# Patient Record
Sex: MALE | Race: WHITE | NOT HISPANIC OR LATINO | Employment: FULL TIME | URBAN - METROPOLITAN AREA
[De-identification: names, ages, dates, MRNs, and addresses within clinical notes are randomized per-mention and may not be internally consistent; named-entity substitution may affect disease eponyms.]

---

## 2017-03-29 ENCOUNTER — OFFICE VISIT (OUTPATIENT)
Dept: URGENT CARE | Facility: PHYSICIAN GROUP | Age: 47
End: 2017-03-29
Payer: COMMERCIAL

## 2017-03-29 ENCOUNTER — HOSPITAL ENCOUNTER (OUTPATIENT)
Dept: RADIOLOGY | Facility: MEDICAL CENTER | Age: 47
End: 2017-03-29
Attending: NURSE PRACTITIONER
Payer: COMMERCIAL

## 2017-03-29 VITALS
HEIGHT: 74 IN | OXYGEN SATURATION: 97 % | TEMPERATURE: 96.9 F | DIASTOLIC BLOOD PRESSURE: 70 MMHG | SYSTOLIC BLOOD PRESSURE: 130 MMHG | BODY MASS INDEX: 30.8 KG/M2 | WEIGHT: 240 LBS | HEART RATE: 92 BPM

## 2017-03-29 DIAGNOSIS — R05.9 COUGH: ICD-10-CM

## 2017-03-29 DIAGNOSIS — R06.02 SOB (SHORTNESS OF BREATH): ICD-10-CM

## 2017-03-29 DIAGNOSIS — J18.9 PNEUMONIA OF LEFT LOWER LOBE DUE TO INFECTIOUS ORGANISM: ICD-10-CM

## 2017-03-29 PROCEDURE — 71020 DX-CHEST-2 VIEWS: CPT

## 2017-03-29 PROCEDURE — 99204 OFFICE O/P NEW MOD 45 MIN: CPT | Performed by: NURSE PRACTITIONER

## 2017-03-29 RX ORDER — PREDNISONE 20 MG/1
TABLET ORAL
Qty: 10 TAB | Refills: 0 | Status: SHIPPED | OUTPATIENT
Start: 2017-03-29 | End: 2017-04-10

## 2017-03-29 RX ORDER — ALBUTEROL SULFATE 90 UG/1
2 AEROSOL, METERED RESPIRATORY (INHALATION) EVERY 6 HOURS PRN
Qty: 8.5 G | Refills: 0 | Status: SHIPPED | OUTPATIENT
Start: 2017-03-29 | End: 2017-04-10 | Stop reason: SDUPTHER

## 2017-03-29 RX ORDER — CODEINE PHOSPHATE AND GUAIFENESIN 10; 100 MG/5ML; MG/5ML
5 SOLUTION ORAL EVERY 4 HOURS PRN
Qty: 400 ML | Refills: 0 | Status: SHIPPED | OUTPATIENT
Start: 2017-03-29 | End: 2017-04-10

## 2017-03-29 RX ORDER — DOXYCYCLINE HYCLATE 100 MG
100 TABLET ORAL 2 TIMES DAILY
Qty: 20 TAB | Refills: 0 | Status: SHIPPED | OUTPATIENT
Start: 2017-03-29 | End: 2017-04-08

## 2017-03-29 ASSESSMENT — COPD QUESTIONNAIRES: COPD: 1

## 2017-03-29 ASSESSMENT — ENCOUNTER SYMPTOMS
RHINORRHEA: 1
COUGH: 1
SWEATS: 1
WHEEZING: 1
FEVER: 1
SORE THROAT: 0
SPUTUM PRODUCTION: 0
HEADACHES: 1
CHILLS: 1
SHORTNESS OF BREATH: 1

## 2017-03-29 NOTE — Clinical Note
March 29, 2017         Patient: Milind Mackey   YOB: 1970   Date of Visit: 3/29/2017           To Whom it May Concern:    Milind Mackey was seen in my clinic on 3/29/2017. He may return to work 4/3/17.        Sincerely,           OMAYRA Tovar  Electronically Signed

## 2017-03-29 NOTE — PROGRESS NOTES
Subjective:      Milind Mackey is a 47 y.o. male who presents with Cough            Cough  This is a new problem. Episode onset: 3 days ago. The problem has been gradually worsening. The problem occurs constantly. The cough is non-productive. Associated symptoms include chills, a fever (102F at home), headaches, nasal congestion, postnasal drip, rhinorrhea, shortness of breath, sweats and wheezing. Pertinent negatives include no ear congestion, ear pain or sore throat. Nothing aggravates the symptoms. Risk factors for lung disease include smoking/tobacco exposure. He has tried rest (Tylenol and Ibuprofen) for the symptoms. The treatment provided no relief. His past medical history is significant for COPD. There is no history of asthma, bronchitis or pneumonia.       Review of Systems   Constitutional: Positive for fever (102F at home), chills and malaise/fatigue.   HENT: Positive for congestion, postnasal drip and rhinorrhea. Negative for ear pain and sore throat.    Respiratory: Positive for cough, shortness of breath and wheezing. Negative for sputum production.    Neurological: Positive for headaches.   All other systems reviewed and are negative.    PMH:  has a past medical history of Hypertension; Hepatitis C; and Tobacco use (5/10/2013). He also has no past medical history of Hyperlipidemia, Diabetes, Depression, or ASTHMA.  MEDS:   Current outpatient prescriptions:   •  methocarbamol (ROBAXIN) 500 MG Tab, Take 1 Tab by mouth 3 times a day. as needed for muscle spasm, Disp: 90 Tab, Rfl: 2  •  hydrocodone/acetaminophen (NORCO)  MG Tab, Take 1/2 to 1 tablet by mouth three per day as needed for pain. The earliest date the pharmacy may fill the rx is 3/22/2016., Disp: 90 Tab, Rfl: 0  •  losartan (COZAAR) 50 MG Tab, Take 1 Tab by mouth every day., Disp: 90 Tab, Rfl: 1  •  gabapentin (NEURONTIN) 300 MG Cap, Take 1 to 2 tablets by mouth every 8 hours as needed for nerve pain., Disp: 100 Cap, Rfl:  "2  ALLERGIES: No Known Allergies  SURGHX:   Past Surgical History   Procedure Laterality Date   • Recovery  4/13/2012     Performed by SURGERY, IR-RECOVERY at SURGERY SAME DAY [facility] ORS     SOCHX:  reports that he has been smoking.  He has never used smokeless tobacco. He reports that he does not drink alcohol or use illicit drugs.  FH: In accordance with LEX Act of 2008, family history is not collected for Occupational Health visits.         Objective:     /70 mmHg  Pulse 92  Temp(Src) 36.1 °C (96.9 °F)  Ht 1.88 m (6' 2\")  Wt 108.863 kg (240 lb)  BMI 30.80 kg/m2  SpO2 97%     Physical Exam   Constitutional: He is oriented to person, place, and time. Vital signs are normal. He appears well-developed and well-nourished.   HENT:   Head: Normocephalic.   Right Ear: Tympanic membrane and external ear normal.   Left Ear: Tympanic membrane and external ear normal.   Nose: Rhinorrhea present. Right sinus exhibits frontal sinus tenderness. Left sinus exhibits frontal sinus tenderness.   Mouth/Throat: Posterior oropharyngeal erythema present.   Eyes: EOM are normal. Pupils are equal, round, and reactive to light.   Neck: Trachea normal, normal range of motion and phonation normal. Neck supple.   Cardiovascular: Normal rate and regular rhythm.    Pulmonary/Chest: Effort normal. He has wheezes in the right upper field, the right lower field, the left upper field and the left lower field. He has rhonchi in the right upper field and the left upper field. He has rales in the left lower field.   Musculoskeletal: Normal range of motion.   Lymphadenopathy:        Head (right side): Submandibular adenopathy present.        Head (left side): Submandibular adenopathy present.     He has no cervical adenopathy.   Neurological: He is alert and oriented to person, place, and time.   Skin: Skin is warm and dry.   Psychiatric: He has a normal mood and affect. His speech is normal and behavior is normal. Thought content " normal.   Vitals reviewed.              Assessment/Plan:     1. Pneumonia of left lower lobe due to infectious organism  - doxycycline (VIBRAMYCIN) 100 MG Tab; Take 1 Tab by mouth 2 times a day for 10 days.  Dispense: 20 Tab; Refill: 0    2. Cough  - DX-CHEST-2 VIEWS; Future  - predniSONE (DELTASONE) 20 MG Tab; Take 2 tabs PO daily for five days  Dispense: 10 Tab; Refill: 0  - albuterol 108 (90 BASE) MCG/ACT Aero Soln inhalation aerosol; Inhale 2 Puffs by mouth every 6 hours as needed for Shortness of Breath.  Dispense: 8.5 g; Refill: 0  - guaifenesin-codeine (ROBITUSSIN AC) Solution oral solution; Take 5 mL by mouth every four hours as needed for Cough.  Dispense: 400 mL; Refill: 0    3. SOB (shortness of breath)  - DX-CHEST-2 VIEWS; Future  - predniSONE (DELTASONE) 20 MG Tab; Take 2 tabs PO daily for five days  Dispense: 10 Tab; Refill: 0  - albuterol 108 (90 BASE) MCG/ACT Aero Soln inhalation aerosol; Inhale 2 Puffs by mouth every 6 hours as needed for Shortness of Breath.  Dispense: 8.5 g; Refill: 0    Increase fluid intake  Tylenol and Ibuprofen for fever/bodyaches  Instructed to make PCP appt in 2-3 weeks for F/U  Supportive care, differential diagnoses, and indications for immediate follow-up discussed with patient.    Pathogenesis of diagnosis discussed including typical length and natural progression.      Instructed to return to  or nearest emergency department if symptoms fail to improve, for any change in condition, further concerns, or new concerning symptoms.  Patient states understanding of the plan of care and discharge instructions.

## 2017-04-03 RX ORDER — LOSARTAN POTASSIUM 50 MG/1
TABLET ORAL
Refills: 0 | OUTPATIENT
Start: 2017-04-03

## 2017-04-03 NOTE — TELEPHONE ENCOUNTER
Was the patient seen in the last year in this department? No 10/2015    Does patient have an active prescription for medications requested? No     Received Request Via: Pharmacy

## 2017-04-04 RX ORDER — LOSARTAN POTASSIUM 50 MG/1
TABLET ORAL
Refills: 0 | OUTPATIENT
Start: 2017-04-04

## 2017-04-10 ENCOUNTER — OFFICE VISIT (OUTPATIENT)
Dept: MEDICAL GROUP | Facility: PHYSICIAN GROUP | Age: 47
End: 2017-04-10
Payer: COMMERCIAL

## 2017-04-10 VITALS
SYSTOLIC BLOOD PRESSURE: 124 MMHG | DIASTOLIC BLOOD PRESSURE: 80 MMHG | HEIGHT: 74 IN | BODY MASS INDEX: 26.69 KG/M2 | OXYGEN SATURATION: 96 % | TEMPERATURE: 100.6 F | RESPIRATION RATE: 16 BRPM | WEIGHT: 208 LBS | HEART RATE: 103 BPM

## 2017-04-10 DIAGNOSIS — J18.9 PNEUMONIA OF LEFT LOWER LOBE DUE TO INFECTIOUS ORGANISM: Primary | ICD-10-CM

## 2017-04-10 DIAGNOSIS — F17.200 CURRENT SMOKER: ICD-10-CM

## 2017-04-10 DIAGNOSIS — I10 ESSENTIAL HYPERTENSION: ICD-10-CM

## 2017-04-10 DIAGNOSIS — R05.9 COUGH: ICD-10-CM

## 2017-04-10 PROCEDURE — 99214 OFFICE O/P EST MOD 30 MIN: CPT | Performed by: NURSE PRACTITIONER

## 2017-04-10 RX ORDER — AZITHROMYCIN 250 MG/1
TABLET, FILM COATED ORAL
Qty: 6 TAB | Refills: 0 | Status: SHIPPED | OUTPATIENT
Start: 2017-04-10 | End: 2018-01-17

## 2017-04-10 RX ORDER — LOSARTAN POTASSIUM 50 MG/1
50 TABLET ORAL
Qty: 90 TAB | Refills: 3 | Status: SHIPPED | OUTPATIENT
Start: 2017-04-10 | End: 2018-05-29 | Stop reason: SDUPTHER

## 2017-04-10 RX ORDER — ALBUTEROL SULFATE 90 UG/1
2 AEROSOL, METERED RESPIRATORY (INHALATION) EVERY 6 HOURS PRN
Qty: 8.5 G | Refills: 1 | Status: SHIPPED | OUTPATIENT
Start: 2017-04-10 | End: 2018-01-23

## 2017-04-10 ASSESSMENT — PATIENT HEALTH QUESTIONNAIRE - PHQ9: CLINICAL INTERPRETATION OF PHQ2 SCORE: 0

## 2017-04-10 NOTE — PROGRESS NOTES
Chief Complaint   Patient presents with   • Follow-Up     pneumonia   • Medication Refill       HISTORY OF PRESENT ILLNESS: Patient is a 47 y.o. male established patient who presents today to follow-up on a recent urgent care visit    1. Pneumonia of left lower lobe due to infectious organism 2. Cough  Patient was seen in the urgent care 2 weeks ago with complaints of cough and fever.  He was given a course of doxycycline and reports completing the entire course one week ago.  He states that symptoms returned shortly after completion.  He reports fever, chills, cough and chest pain.  He does report some relief with the albuterol.  He continues to smoke, however he reduce the use from one pack a day to one pack every 3-4 days.  He states that he is going to use this opportunity to quit smoking altogether.      3. Current smoker    4. Essential hypertension   Patient is also requesting a refill of losartan.  He uses 50 mg daily.  Blood pressure today is well controlled with his current medication regimen.  He does not monitor his blood pressure routinely outside of the clinic.    Allergies:Review of patient's allergies indicates no known allergies.    Current Outpatient Prescriptions Ordered in [facility]   Medication Sig Dispense Refill   • azithromycin (ZITHROMAX) 250 MG Tab Take 500mg day 1, then 250mg days 2-5. 6 Tab 0   • albuterol 108 (90 BASE) MCG/ACT Aero Soln inhalation aerosol Inhale 2 Puffs by mouth every 6 hours as needed for Shortness of Breath. 8.5 g 1   • losartan (COZAAR) 50 MG Tab Take 1 Tab by mouth every day. 90 Tab 3   • methocarbamol (ROBAXIN) 500 MG Tab Take 1 Tab by mouth 3 times a day. as needed for muscle spasm 90 Tab 2   • gabapentin (NEURONTIN) 300 MG Cap Take 1 to 2 tablets by mouth every 8 hours as needed for nerve pain. 100 Cap 2   • hydrocodone/acetaminophen (NORCO)  MG Tab Take 1/2 to 1 tablet by mouth three per day as needed for pain. The earliest date the pharmacy may fill the rx is  "3/22/2016. 90 Tab 0     No current Epic-ordered facility-administered medications on file.       Past Medical History   Diagnosis Date   • Hypertension    • Hepatitis C    • Tobacco use 5/10/2013       Social History   Substance Use Topics   • Smoking status: Current Every Day Smoker -- 1.00 packs/day for 25 years   • Smokeless tobacco: Never Used   • Alcohol Use: No       Family Status   Relation Status Death Age   • Mother     • Father     • Sister Alive    • Brother Alive    • Maternal Grandmother     • Sister Alive      Family History   Problem Relation Age of Onset   • Other Mother 63     hepatits C, cirrhosis   • Heart Attack Father 50   • Hypertension Sister    • Other Sister      MS   • Hypertension Brother    • Cancer Maternal Grandmother    • Diabetes Neg Hx    • Cancer Sister      lymph node       ROS: see above    Review of Systems   Constitutional: Negative for fever, chills, weight loss and malaise/fatigue.   HENT: Negative for ear pain, nosebleeds, congestion, sore throat and neck pain.    Eyes: Negative for blurred vision.   Respiratory: Negative for cough, sputum production, shortness of breath and wheezing.    Cardiovascular: Negative for chest pain, palpitations, orthopnea and leg swelling.   Gastrointestinal: Negative for heartburn, nausea, vomiting and abdominal pain.   Genitourinary: Negative for dysuria, urgency and frequency.   Musculoskeletal: Negative for myalgias, back pain and joint pain.   Skin: Negative for rash and itching.   Neurological: Negative for dizziness, tingling, tremors, sensory change, focal weakness and headaches.   Endo/Heme/Allergies: Does not bruise/bleed easily.   Psychiatric/Behavioral: Negative for depression, suicidal ideas and memory loss.  The patient is not nervous/anxious and does not have insomnia.        Exam:  Blood pressure 124/80, pulse 103, temperature 38.1 °C (100.6 °F), resp. rate 16, height 1.88 m (6' 2\"), weight 94.348 kg (208 " lb), SpO2 96 %.  General:  Well nourished, well developed male in NAD  Head is grossly normal.  Neck: Thyroid is not enlarged.  Pulmonary: Increased effort.  Absent breath sounds in the left lower lobe.  Rhonchi and wheezes scattered throughout the remaining lobes.  Cardiovascular: Tachycardic rate with regular rhythm.  Extremities: no clubbing, cyanosis, or edema.  Psych:  Mood and affect are normal.  Answering questions appropriately with good eye contact.      Please note that this dictation was created using voice recognition software. I have made every reasonable attempt to correct obvious errors, but I expect that there are errors of grammar and possibly content that I did not discover before finalizing the note.    Assessment/Plan:    1. Pneumonia of left lower lobe due to infectious organism  azithromycin (ZITHROMAX) 250 MG Tab   2. Cough  albuterol 108 (90 BASE) MCG/ACT Aero Soln inhalation aerosol   3. Current smoker  DX-CHEST-2 VIEWS    azithromycin (ZITHROMAX) 250 MG Tab   4. Essential hypertension  losartan (COZAAR) 50 MG Tab        1.  Course of azithromycin, encourage patient to complete entire course as prescribed.  2.  Repeat chest x-ray at completion to ensure resolution, or at least improvement.  3.  Refill albuterol and losartan as previously prescribed.  4.  Will contact patient with the above imaging results, follow-up pending.

## 2017-04-17 ENCOUNTER — HOSPITAL ENCOUNTER (OUTPATIENT)
Dept: RADIOLOGY | Facility: MEDICAL CENTER | Age: 47
End: 2017-04-17
Attending: NURSE PRACTITIONER
Payer: COMMERCIAL

## 2017-04-17 DIAGNOSIS — F17.200 CURRENT SMOKER: ICD-10-CM

## 2017-04-17 PROCEDURE — 71020 DX-CHEST-2 VIEWS: CPT

## 2017-04-18 ENCOUNTER — TELEPHONE (OUTPATIENT)
Dept: MEDICAL GROUP | Facility: PHYSICIAN GROUP | Age: 47
End: 2017-04-18

## 2017-04-18 NOTE — TELEPHONE ENCOUNTER
----- Message from KEY Mckeon sent at 4/18/2017 12:58 PM PDT -----  No evidence of lingering pneumonia.  No further treatment needed at this time.  Thank you

## 2018-01-23 ENCOUNTER — OFFICE VISIT (OUTPATIENT)
Dept: MEDICAL GROUP | Facility: PHYSICIAN GROUP | Age: 48
End: 2018-01-23
Payer: COMMERCIAL

## 2018-01-23 VITALS
WEIGHT: 222 LBS | DIASTOLIC BLOOD PRESSURE: 80 MMHG | HEIGHT: 74 IN | BODY MASS INDEX: 28.49 KG/M2 | OXYGEN SATURATION: 96 % | RESPIRATION RATE: 14 BRPM | SYSTOLIC BLOOD PRESSURE: 120 MMHG | TEMPERATURE: 98.8 F | HEART RATE: 85 BPM

## 2018-01-23 DIAGNOSIS — R06.83 SNORING: ICD-10-CM

## 2018-01-23 DIAGNOSIS — Z72.0 TOBACCO USE: ICD-10-CM

## 2018-01-23 DIAGNOSIS — I10 ESSENTIAL HYPERTENSION: ICD-10-CM

## 2018-01-23 DIAGNOSIS — B18.2 CHRONIC HEPATITIS C WITHOUT HEPATIC COMA (HCC): ICD-10-CM

## 2018-01-23 DIAGNOSIS — L20.82 FLEXURAL ECZEMA: ICD-10-CM

## 2018-01-23 DIAGNOSIS — E66.9 OBESITY (BMI 30.0-34.9): ICD-10-CM

## 2018-01-23 DIAGNOSIS — Z86.19 HISTORY OF HEPATITIS C: ICD-10-CM

## 2018-01-23 PROCEDURE — 99214 OFFICE O/P EST MOD 30 MIN: CPT | Performed by: NURSE PRACTITIONER

## 2018-01-23 RX ORDER — TRIAMCINOLONE ACETONIDE 1 MG/G
1 OINTMENT TOPICAL 2 TIMES DAILY
Qty: 1 TUBE | Refills: 3 | Status: SHIPPED | OUTPATIENT
Start: 2018-01-23 | End: 2018-09-05 | Stop reason: SDUPTHER

## 2018-01-24 NOTE — ASSESSMENT & PLAN NOTE
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  Has dry, excoriations on back of hands.  Using [employer] Working hands.

## 2018-01-24 NOTE — PROGRESS NOTES
Milind Mackey is a 47 y.o. male here today to establish care and for evaluation and management of:    HPI:    Hypertension  Losartan daily in am.  /80. No chest pain, some shortness of breath (1 ppd smoker)    Snoring  Wife reports snoring and breath holding.  Would like to get pulmonology  Referral.     Obesity (BMI 30.0-34.9)  Weight is down from 240 last year.  No exercise plan at this time.     Tobacco use  1 ppd X 30 years.  Cough, some wheezing. Pneumonia last year.     Hepatitis C  Treated by GI (interferon).  No abdominal pain.  Overdue for labs.    Flexural eczema  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  Has dry, excoriations on back of hands.  Using [employer] Working hands.       Current medicines (including changes today)  Current Outpatient Prescriptions   Medication Sig Dispense Refill   • triamcinolone acetonide (KENALOG) 0.1 % Ointment Apply 1 Application to affected area(s) 2 times a day. 1 Tube 3   • losartan (COZAAR) 50 MG Tab Take 1 Tab by mouth every day. 90 Tab 3     No current facility-administered medications for this visit.        He  has a past medical history of Hepatitis C; Hypertension; and Tobacco use (5/10/2013). He also has no past medical history of ASTHMA; Depression; Diabetes; or Hyperlipidemia.    He  has a past surgical history that includes recovery (4/13/2012) and liver biopsy.    Social History   Substance Use Topics   • Smoking status: Current Every Day Smoker     Packs/day: 1.00     Years: 30.00     Types: Cigarettes   • Smokeless tobacco: Never Used   • Alcohol use No       Social History     Social History Narrative   • No narrative on file       Family History   Problem Relation Age of Onset   • Other Mother 63     hepatits C, cirrhosis   • Heart Attack Father 50   • Hypertension Sister    • Other Sister      MS   • Hypertension Brother    • Cancer Maternal Grandmother    • Cancer Sister      lymph node   • Diabetes Neg Hx   "      Family Status   Relation Status   • Mother    • Father    • Sister Alive   • Brother Alive   • Maternal Grandmother    • Sister Alive   • Neg Hx          ROS  As stated in hpi  All other systems reviewed and are negative     Objective:     Blood pressure 120/80, pulse 85, temperature 37.1 °C (98.8 °F), resp. rate 14, height 1.88 m (6' 2\"), weight 100.7 kg (222 lb), SpO2 96 %. Body mass index is 28.5 kg/m².  Physical Exam:    Constitutional: Alert, no distress.  Skin: Warm, dry, good turgor, no rashes in visible areas.  Eye: Equal, round and reactive, conjunctiva clear, lids normal.  ENMT: Lips without lesions, good dentition, oropharynx clear.  Neck: Trachea midline, no masses, no thyromegaly. No cervical or supraclavicular lymphadenopathy.  Respiratory: Unlabored respiratory effort, lungs clear to auscultation, no wheezes, no ronchi.  Cardiovascular: Normal S1, S2, no murmur, no edema.  Abdomen: Soft, non-tender, no masses, no hepatosplenomegaly.  Psych: Alert and oriented x3, normal affect and mood.        Assessment and Plan:   The following treatment plan was discussed    1. Essential hypertension  This is a new problem to me. Chronic. Ongoing. Blood pressure goal. Continue losartan 50 mg daily.    2. Snoring  This is a new problem to me. Acute. Will refer to pulmonology for evaluation and possible sleeps study. Discussed the importance of evaluating snoring and ruling out and/or treating sleep apnea. Patient verbalizes understanding. Monitor results.  - REFERRAL TO PULMONOLOGY    3. Obesity (BMI 30.0-34.9)  This is a problem to me. Chronic. Ongoing issue. Patient has lost 22 pounds in the last year. His current BMI is 28.5 today.    4. History of hepatitis C  This is a new problem to me. Chronic. Ongoing. Is being followed by [employer] [facility]. He is due for liver enzymes. Labs ordered. Monitor results. No abdominal pain reported. No jaundice reported.  - CBC WITH DIFFERENTIAL; " Future  - COMP METABOLIC PANEL; Future  - LIPID PROFILE; Future    5. Flexural eczema  This is a new problem to me. Acute. Eczema. Triamcinolone ointment ordered. Use twice a day on hands. Avoid hot water, continue to use gloves to avoid contact with chemicals during his work. Increase use of lotions to improve hydration to his skin. Monitor and follow.    6. Tobacco use  This is a new problem to me. Chronic. Unstable. Discussed at length smoking cessation.    7. Chronic hepatitis C without hepatic coma (CMS-HCC)  See problem #4.      Records requested.  Followup: Return in about 1 year (around 1/23/2019) for Annual.

## 2018-01-29 ENCOUNTER — TELEPHONE (OUTPATIENT)
Dept: MEDICAL GROUP | Facility: PHYSICIAN GROUP | Age: 48
End: 2018-01-29

## 2018-01-29 ENCOUNTER — HOSPITAL ENCOUNTER (OUTPATIENT)
Dept: LAB | Facility: MEDICAL CENTER | Age: 48
End: 2018-01-29
Attending: NURSE PRACTITIONER
Payer: COMMERCIAL

## 2018-01-29 DIAGNOSIS — Z86.19 HISTORY OF HEPATITIS C: ICD-10-CM

## 2018-01-29 LAB
ALBUMIN SERPL BCP-MCNC: 4.5 G/DL (ref 3.2–4.9)
ALBUMIN/GLOB SERPL: 2 G/DL
ALP SERPL-CCNC: 58 U/L (ref 30–99)
ALT SERPL-CCNC: 14 U/L (ref 2–50)
ANION GAP SERPL CALC-SCNC: 3 MMOL/L (ref 0–11.9)
AST SERPL-CCNC: 20 U/L (ref 12–45)
BASOPHILS # BLD AUTO: 0.7 % (ref 0–1.8)
BASOPHILS # BLD: 0.05 K/UL (ref 0–0.12)
BILIRUB SERPL-MCNC: 0.6 MG/DL (ref 0.1–1.5)
BUN SERPL-MCNC: 20 MG/DL (ref 8–22)
CALCIUM SERPL-MCNC: 8.9 MG/DL (ref 8.5–10.5)
CHLORIDE SERPL-SCNC: 109 MMOL/L (ref 96–112)
CHOLEST SERPL-MCNC: 164 MG/DL (ref 100–199)
CO2 SERPL-SCNC: 26 MMOL/L (ref 20–33)
CREAT SERPL-MCNC: 0.94 MG/DL (ref 0.5–1.4)
EOSINOPHIL # BLD AUTO: 0.13 K/UL (ref 0–0.51)
EOSINOPHIL NFR BLD: 1.9 % (ref 0–6.9)
ERYTHROCYTE [DISTWIDTH] IN BLOOD BY AUTOMATED COUNT: 43.6 FL (ref 35.9–50)
GLOBULIN SER CALC-MCNC: 2.3 G/DL (ref 1.9–3.5)
GLUCOSE SERPL-MCNC: 78 MG/DL (ref 65–99)
HCT VFR BLD AUTO: 45.4 % (ref 42–52)
HDLC SERPL-MCNC: 53 MG/DL
HGB BLD-MCNC: 14.6 G/DL (ref 14–18)
IMM GRANULOCYTES # BLD AUTO: 0.01 K/UL (ref 0–0.11)
IMM GRANULOCYTES NFR BLD AUTO: 0.1 % (ref 0–0.9)
LDLC SERPL CALC-MCNC: 92 MG/DL
LYMPHOCYTES # BLD AUTO: 2.22 K/UL (ref 1–4.8)
LYMPHOCYTES NFR BLD: 32 % (ref 22–41)
MCH RBC QN AUTO: 30 PG (ref 27–33)
MCHC RBC AUTO-ENTMCNC: 32.2 G/DL (ref 33.7–35.3)
MCV RBC AUTO: 93.4 FL (ref 81.4–97.8)
MONOCYTES # BLD AUTO: 0.54 K/UL (ref 0–0.85)
MONOCYTES NFR BLD AUTO: 7.8 % (ref 0–13.4)
NEUTROPHILS # BLD AUTO: 3.98 K/UL (ref 1.82–7.42)
NEUTROPHILS NFR BLD: 57.5 % (ref 44–72)
NRBC # BLD AUTO: 0 K/UL
NRBC BLD-RTO: 0 /100 WBC
PLATELET # BLD AUTO: 198 K/UL (ref 164–446)
PMV BLD AUTO: 10.4 FL (ref 9–12.9)
POTASSIUM SERPL-SCNC: 4.7 MMOL/L (ref 3.6–5.5)
PROT SERPL-MCNC: 6.8 G/DL (ref 6–8.2)
RBC # BLD AUTO: 4.86 M/UL (ref 4.7–6.1)
SODIUM SERPL-SCNC: 138 MMOL/L (ref 135–145)
TRIGL SERPL-MCNC: 96 MG/DL (ref 0–149)
WBC # BLD AUTO: 6.9 K/UL (ref 4.8–10.8)

## 2018-01-29 PROCEDURE — 85025 COMPLETE CBC W/AUTO DIFF WBC: CPT

## 2018-01-29 PROCEDURE — 80061 LIPID PANEL: CPT

## 2018-01-29 PROCEDURE — 36415 COLL VENOUS BLD VENIPUNCTURE: CPT

## 2018-01-29 PROCEDURE — 80053 COMPREHEN METABOLIC PANEL: CPT

## 2018-01-29 NOTE — LETTER
January 29, 2018         Milind Yepez Rather  [address] [place] [address]        Dear Milind:      Below are the results from your recent visit:    Labs are back.  Kidney, liver, cholesterol, triglycerides all in the normal range.  Blood count all looks good.   OMAYRA Palacios     Resulted Orders   CBC WITH DIFFERENTIAL   Result Value Ref Range    WBC 6.9 4.8 - 10.8 K/uL    RBC 4.86 4.70 - 6.10 M/uL    Hemoglobin 14.6 14.0 - 18.0 g/dL    Hematocrit 45.4 42.0 - 52.0 %    MCV 93.4 81.4 - 97.8 fL    MCH 30.0 27.0 - 33.0 pg    MCHC 32.2 (L) 33.7 - 35.3 g/dL    RDW 43.6 35.9 - 50.0 fL    Platelet Count 198 164 - 446 K/uL    MPV 10.4 9.0 - 12.9 fL    Neutrophils-Polys 57.50 44.00 - 72.00 %    Lymphocytes 32.00 22.00 - 41.00 %    Monocytes 7.80 0.00 - 13.40 %    Eosinophils 1.90 0.00 - 6.90 %    Basophils 0.70 0.00 - 1.80 %    Immature Granulocytes 0.10 0.00 - 0.90 %    Nucleated RBC 0.00 /100 WBC    Neutrophils (Absolute) 3.98 1.82 - 7.42 K/uL      Comment:      Includes immature neutrophils, if present.    Lymphs (Absolute) 2.22 1.00 - 4.80 K/uL    Monos (Absolute) 0.54 0.00 - 0.85 K/uL    Eos (Absolute) 0.13 0.00 - 0.51 K/uL    Baso (Absolute) 0.05 0.00 - 0.12 K/uL    Immature Granulocytes (abs) 0.01 0.00 - 0.11 K/uL    NRBC (Absolute) 0.00 K/uL    Narrative    Request patient fasting?->Yes   COMP METABOLIC PANEL   Result Value Ref Range    Sodium 138 135 - 145 mmol/L    Potassium 4.7 3.6 - 5.5 mmol/L    Chloride 109 96 - 112 mmol/L    Co2 26 20 - 33 mmol/L    Anion Gap 3.0 0.0 - 11.9    Glucose 78 65 - 99 mg/dL    Bun 20 8 - 22 mg/dL    Creatinine 0.94 0.50 - 1.40 mg/dL    Calcium 8.9 8.5 - 10.5 mg/dL    AST(SGOT) 20 12 - 45 U/L    ALT(SGPT) 14 2 - 50 U/L    Alkaline Phosphatase 58 30 - 99 U/L    Total Bilirubin 0.6 0.1 - 1.5 mg/dL    Albumin 4.5 3.2 - 4.9 g/dL    Total Protein 6.8 6.0 - 8.2 g/dL    Globulin 2.3 1.9 - 3.5 g/dL    A-G Ratio 2.0 g/dL    Narrative    Request patient fasting?->Yes   LIPID  PROFILE   Result Value Ref Range    Cholesterol,Tot 164 100 - 199 mg/dL    Triglycerides 96 0 - 149 mg/dL    HDL 53 >=40 mg/dL    LDL 92 <100 mg/dL    Narrative    Request patient fasting?->Yes   ESTIMATED GFR   Result Value Ref Range    GFR If African American >60 >60 mL/min/1.73 m 2    GFR If Non African American >60 >60 mL/min/1.73 m 2    Narrative    Request patient fasting?->Yes     If you have any questions or concerns, please don't hesitate to call.    Electronically Signed

## 2018-05-29 DIAGNOSIS — I10 ESSENTIAL HYPERTENSION: ICD-10-CM

## 2018-05-29 RX ORDER — LOSARTAN POTASSIUM 50 MG/1
50 TABLET ORAL
Qty: 90 TAB | Refills: 2 | Status: SHIPPED | OUTPATIENT
Start: 2018-05-29 | End: 2019-02-26 | Stop reason: SDUPTHER

## 2018-05-29 NOTE — TELEPHONE ENCOUNTER
Was the patient seen in the last year in this department? Yes     Does patient have an active prescription for medications requested? Yes  DIFFERENT PROVIDER    Received Request Via: Patient

## 2018-05-29 NOTE — TELEPHONE ENCOUNTER
Requested Prescriptions     Signed Prescriptions Disp Refills   • losartan (COZAAR) 50 MG Tab 90 Tab 2     Sig: Take 1 Tab by mouth every day.     Authorizing Provider: PAULINA LAUREANO A.P.R.N.

## 2018-09-05 RX ORDER — TRIAMCINOLONE ACETONIDE 1 MG/G
OINTMENT TOPICAL
Qty: 15 G | Refills: 3 | Status: SHIPPED | OUTPATIENT
Start: 2018-09-05 | End: 2020-01-20

## 2018-09-05 NOTE — TELEPHONE ENCOUNTER
Requested Prescriptions     Signed Prescriptions Disp Refills   • triamcinolone acetonide (KENALOG) 0.1 % Ointment 15 g 3     Sig: APPLY 1 APPLICATION TO HANDS TWICE DAILY     Authorizing Provider: PAULINA LAUREANO A.P.R.N.

## 2018-09-05 NOTE — TELEPHONE ENCOUNTER
Was the patient seen in the last year in this department? Yes LOV 01/23/18    Does patient have an active prescription for medications requested? No     Received Request Via: Pharmacy

## 2018-12-05 ENCOUNTER — HOSPITAL ENCOUNTER (OUTPATIENT)
Dept: RADIOLOGY | Facility: MEDICAL CENTER | Age: 48
End: 2018-12-05
Attending: FAMILY MEDICINE
Payer: COMMERCIAL

## 2018-12-05 ENCOUNTER — OFFICE VISIT (OUTPATIENT)
Dept: URGENT CARE | Facility: PHYSICIAN GROUP | Age: 48
End: 2018-12-05
Payer: COMMERCIAL

## 2018-12-05 VITALS
RESPIRATION RATE: 18 BRPM | TEMPERATURE: 98.8 F | WEIGHT: 220 LBS | DIASTOLIC BLOOD PRESSURE: 80 MMHG | OXYGEN SATURATION: 99 % | SYSTOLIC BLOOD PRESSURE: 120 MMHG | HEART RATE: 78 BPM | BODY MASS INDEX: 28.23 KG/M2 | HEIGHT: 74 IN

## 2018-12-05 DIAGNOSIS — K13.79 ORAL MASS: ICD-10-CM

## 2018-12-05 DIAGNOSIS — R22.1 LOCALIZED SWELLING, MASS OR LUMP OF NECK: ICD-10-CM

## 2018-12-05 PROCEDURE — 76536 US EXAM OF HEAD AND NECK: CPT

## 2018-12-05 PROCEDURE — 99213 OFFICE O/P EST LOW 20 MIN: CPT | Performed by: FAMILY MEDICINE

## 2018-12-05 NOTE — PROGRESS NOTES
"Chief Complaint   Patient presents with   • Abscess     in R side mouth x2weeks          HPI:      C/o \"nodule\" in mouth x 2 wks.    Denies trauma.  States that it is slowly getting bigger.   Sometimes tender to touch.    Denies any other sx.   He is a current smoker.   He has not tried anything for this.        Past Medical History:   Diagnosis Date   • Hepatitis C    • Hypertension    • Tobacco use 5/10/2013     Family history was reviewed and not pertinent     Social History   Substance Use Topics   • Smoking status: Current Every Day Smoker     Packs/day: 1.00     Years: 30.00     Types: Cigarettes   • Smokeless tobacco: Never Used   • Alcohol use No             Review of Systems   Constitutional: Negative for fever, chills and malaise/fatigue.   Eyes: Negative for vision changes, d/c.    Respiratory: Negative for cough and sputum production.    Cardiovascular: Negative for chest pain and palpitations.   Gastrointestinal: Negative for nausea, vomiting, abdominal pain, diarrhea and constipation.   Genitourinary: Negative for dysuria, urgency and frequency.   Skin: Negative for rash or  itching.   Neurological: Negative for dizziness and tingling.   Psychiatric/Behavioral: Negative for depression.   Hematologic/lymphatic - denies bruising or excessive bleeding  All other systems reviewed and are negative.        OBJECTIVE  /80   Pulse 78   Temp 37.1 °C (98.8 °F) (Temporal)   Resp 18   Ht 1.88 m (6' 2\")   Wt 99.8 kg (220 lb)   SpO2 99%   BMI 28.25 kg/m²   HEENT - PERRLA, EOMI  Nose - no edema  No post pharyngeal erythema or swelling.   There is a 2x2 solid, firm, immobile mass, Rt buccal mucosa.  No drainage.  No TTP.   No fluctuance.   It does not appear cystic    Lymph - no significant cervical  LAD  Neuro - alert and oriented x3. CN 2-12 grossly intact.  Lungs - CTA. No wheezes, rhonchi or rales.  Heart - regular rate and rhythm without murmur.  Abdomen - soft and non-tender, bowel sounds active " x4.  Musculoskeletal - No lower extremity edema noted.          12/5/2018 10:37 AM    HISTORY/REASON FOR EXAM:  Rt palpable lump 1 week      TECHNIQUE/EXAM DESCRIPTION:  Ultrasound of the soft tissues of the head and neck.    COMPARISON:  None    FINDINGS:    There is a complex 1.5 x 1.6 x 1.1 cm mass with peripheral flow in the right cheek near the upper lip. The mass appears solid.   Impression           1. Complex, possibly solid, 1.5 x 1.6 x 1.1 cm mass with peripheral flow in the right cheek near the upper lip. Further evaluation with CT or MR is recommended.   Reading Provider Reading Date   Richard Tejeda M.D. Dec 5, 2018   Signing Provider Signing Date Signing Time   Richard Tejeda M.D. Dec 5, 2018 10:59 AM       A/P:    1. Oral mass  I originally thought it may be a mucocele, but u/s was personally reviewed and it reveals an approximately 2x2cm solid mass    CT ordered for further characterization    Will refer to ENT for biopsy, especially given his hx of tobacco use.         - US-SOFT TISSUES OF HEAD - NECK; Future  - CT-SOFT TISSUE NECK WITH; Future  - REFERRAL TO ENT

## 2018-12-06 ENCOUNTER — TELEPHONE (OUTPATIENT)
Dept: URGENT CARE | Facility: PHYSICIAN GROUP | Age: 48
End: 2018-12-06

## 2018-12-06 ENCOUNTER — APPOINTMENT (OUTPATIENT)
Dept: RADIOLOGY | Facility: MEDICAL CENTER | Age: 48
End: 2018-12-06
Attending: FAMILY MEDICINE
Payer: COMMERCIAL

## 2018-12-06 ENCOUNTER — TELEPHONE (OUTPATIENT)
Dept: MEDICAL GROUP | Facility: PHYSICIAN GROUP | Age: 48
End: 2018-12-06

## 2018-12-06 RX ORDER — SULFAMETHOXAZOLE AND TRIMETHOPRIM 800; 160 MG/1; MG/1
1 TABLET ORAL 2 TIMES DAILY
Qty: 14 TAB | Refills: 0 | Status: SHIPPED | OUTPATIENT
Start: 2018-12-06 | End: 2018-12-13

## 2018-12-06 NOTE — TELEPHONE ENCOUNTER
VOICEMAIL  1. Caller Name: Emily (wife)                     Call Back Number: [phone] (home)       2. Message: Patient's wife called and the abscess ruptured in his mouth, pus and blood came out he did swallow a little but spit most of it out. She canceled the CT but is wondering if he should be on antibiotics or what he should do. Please advise     3. Patient approves office to leave a detailed voicemail/MyChart message: N\A

## 2018-12-06 NOTE — TELEPHONE ENCOUNTER
"Pt states that the mass in mouth \"popped\" draining some purulent fluid.       Plan:    Rx bactrim    I am still suspicious for underlying mass - he was referred to ENT for further treatment  "

## 2019-02-26 DIAGNOSIS — I10 ESSENTIAL HYPERTENSION: ICD-10-CM

## 2019-02-26 RX ORDER — LOSARTAN POTASSIUM 50 MG/1
50 TABLET ORAL
Qty: 90 TAB | Refills: 0 | Status: SHIPPED | OUTPATIENT
Start: 2019-02-26 | End: 2019-05-21 | Stop reason: SDUPTHER

## 2019-02-26 NOTE — TELEPHONE ENCOUNTER
Requested Prescriptions     Signed Prescriptions Disp Refills   • losartan (COZAAR) 50 MG Tab 90 Tab 0     Sig: Take 1 Tab by mouth every day.     Authorizing Provider: PAULINA LAUREANO for labs and yearly visit  OMAYRA Palacios

## 2019-02-26 NOTE — TELEPHONE ENCOUNTER
Was the patient seen in the last year in this department? No LOV 01/23/18 NO UP COMING SCHEDULE [facility]. LABS 01/29/18    Does patient have an active prescription for medications requested? No     Received Request Via: Pharmacy

## 2019-09-14 ENCOUNTER — HOSPITAL ENCOUNTER (OUTPATIENT)
Dept: RADIOLOGY | Facility: MEDICAL CENTER | Age: 49
End: 2019-09-14
Attending: EMERGENCY MEDICINE
Payer: COMMERCIAL

## 2019-09-14 ENCOUNTER — OFFICE VISIT (OUTPATIENT)
Dept: URGENT CARE | Facility: PHYSICIAN GROUP | Age: 49
End: 2019-09-14
Payer: COMMERCIAL

## 2019-09-14 VITALS
BODY MASS INDEX: 28.23 KG/M2 | SYSTOLIC BLOOD PRESSURE: 140 MMHG | WEIGHT: 220 LBS | TEMPERATURE: 98.2 F | DIASTOLIC BLOOD PRESSURE: 84 MMHG | RESPIRATION RATE: 12 BRPM | HEART RATE: 77 BPM | HEIGHT: 74 IN | OXYGEN SATURATION: 96 %

## 2019-09-14 DIAGNOSIS — M25.562 ACUTE PAIN OF LEFT KNEE: ICD-10-CM

## 2019-09-14 DIAGNOSIS — M23.92 DERANGEMENT, KNEE INTERNAL, LEFT: ICD-10-CM

## 2019-09-14 PROCEDURE — 99214 OFFICE O/P EST MOD 30 MIN: CPT | Performed by: EMERGENCY MEDICINE

## 2019-09-14 PROCEDURE — 73564 X-RAY EXAM KNEE 4 OR MORE: CPT | Mod: LT

## 2019-09-14 ASSESSMENT — ENCOUNTER SYMPTOMS
NUMBNESS: 0
FOCAL WEAKNESS: 0
SENSORY CHANGE: 0
FEVER: 0
JOINT SWELLING: 1

## 2019-09-14 NOTE — PROGRESS NOTES
"Subjective:      Milind Mackey is a 49 y.o. male who presents with Knee Pain (left knee pain x3 weeks )            Knee Pain   This is a new problem. Episode onset: over 3 weeks. The problem occurs daily. The problem has been waxing and waning. Associated symptoms include joint swelling. Pertinent negatives include no fever, numbness or rash. The symptoms are aggravated by walking and bending. He has tried rest and immobilization for the symptoms. The treatment provided no relief.   No specific trauma.    Review of Systems   Constitutional: Negative for fever.   Cardiovascular: Negative for leg swelling.   Musculoskeletal: Positive for joint swelling.   Skin: Negative for rash.   Neurological: Negative for sensory change, focal weakness and numbness.     PMH:  has a past medical history of Hepatitis C, Hypertension, and Tobacco use (5/10/2013). He also has no past medical history of ASTHMA, Depression, Diabetes, or Hyperlipidemia.  MEDS:   Current Outpatient Medications:   •  losartan (COZAAR) 50 MG Tab, TAKE 1 TABLET BY MOUTH EVERY DAY, Disp: 90 Tab, Rfl: 0  •  triamcinolone acetonide (KENALOG) 0.1 % Ointment, APPLY 1 APPLICATION TO HANDS TWICE DAILY, Disp: 15 g, Rfl: 3  ALLERGIES: No Known Allergies  SURGHX:   Past Surgical History:   Procedure Laterality Date   • RECOVERY  4/13/2012    Performed by SURGERY, IR-RECOVERY at SURGERY SAME DAY [facility] ORS   • LIVER BIOPSY       SOCHX:  reports that he has been smoking cigarettes. He has a 30.00 pack-year smoking history. He has never used smokeless tobacco. He reports that he does not drink alcohol or use drugs.  FH: family history includes Cancer in his maternal grandmother and sister; Heart Attack (age of onset: 50) in his father; Hypertension in his brother and sister; Other in his sister; Other (age of onset: 63) in his mother.     Objective:     /84   Pulse 77   Temp 36.8 °C (98.2 °F) (Temporal)   Resp 12   Ht 1.88 m (6' 2\")   Wt 99.8 kg (220 lb)  "  SpO2 96%   BMI 28.25 kg/m²      Physical Exam   Constitutional: He appears well-developed and well-nourished. He is cooperative. He does not have a sickly appearance. He does not appear ill. No distress.   Cardiovascular:   Pulses:       Posterior tibial pulses are 2+ on the left side.   Musculoskeletal:        Left knee: He exhibits swelling and effusion. He exhibits normal range of motion, no ecchymosis, no deformity, no erythema, normal alignment, no LCL laxity, normal patellar mobility and no MCL laxity. Tenderness found. Medial joint line and patellar tendon tenderness noted. No lateral joint line, no MCL and no LCL tenderness noted.   Neurological: He is alert.   Distal sensation to light touch and pressure intact.   Skin: Skin is warm, dry and intact.   Psychiatric: He has a normal mood and affect.               Assessment/Plan:     1. Derangement, knee internal, left  Hinged knee brace, ice/heat PRN, OTC analgesia PRN  - REFERRAL TO SPORTS MEDICINE    2. Acute pain of left knee  - DX-KNEE COMPLETE 4+ LEFT; per radiologist:  1.  Large LEFT knee joint effusion of uncertain etiology.  2.  No fracture or dislocation.

## 2019-09-24 ENCOUNTER — OFFICE VISIT (OUTPATIENT)
Dept: MEDICAL GROUP | Facility: CLINIC | Age: 49
End: 2019-09-24
Payer: COMMERCIAL

## 2019-09-24 VITALS
BODY MASS INDEX: 28.23 KG/M2 | DIASTOLIC BLOOD PRESSURE: 78 MMHG | WEIGHT: 220 LBS | SYSTOLIC BLOOD PRESSURE: 122 MMHG | HEART RATE: 88 BPM | OXYGEN SATURATION: 99 % | HEIGHT: 74 IN | TEMPERATURE: 98.8 F | RESPIRATION RATE: 18 BRPM

## 2019-09-24 DIAGNOSIS — M62.9 HAMSTRING TIGHTNESS OF BOTH LOWER EXTREMITIES: ICD-10-CM

## 2019-09-24 DIAGNOSIS — M17.12 OSTEOARTHROSIS, LOCALIZED, PRIMARY, KNEE, LEFT: ICD-10-CM

## 2019-09-24 DIAGNOSIS — M76.892 ENTHESOPATHY OF LEFT KNEE REGION: ICD-10-CM

## 2019-09-24 DIAGNOSIS — F17.200 SMOKER: ICD-10-CM

## 2019-09-24 PROCEDURE — 20610 DRAIN/INJ JOINT/BURSA W/O US: CPT | Mod: LT | Performed by: FAMILY MEDICINE

## 2019-09-24 PROCEDURE — 99214 OFFICE O/P EST MOD 30 MIN: CPT | Mod: 25 | Performed by: FAMILY MEDICINE

## 2019-09-24 RX ORDER — TRIAMCINOLONE ACETONIDE 40 MG/ML
40 INJECTION, SUSPENSION INTRA-ARTICULAR; INTRAMUSCULAR ONCE
Status: COMPLETED | OUTPATIENT
Start: 2019-09-24 | End: 2019-09-24

## 2019-09-24 RX ADMIN — TRIAMCINOLONE ACETONIDE 40 MG: 40 INJECTION, SUSPENSION INTRA-ARTICULAR; INTRAMUSCULAR at 08:42

## 2019-09-24 NOTE — PROCEDURES
PROCEDURE NOTE:  left knee corticosteroid injection  Consent was obtained, using sterile technique the left knee was prepped   Infra-lateral patellar approach.   Steroid kenalog 40 mg and 5 ml plain bupivacaine was then injected and the needle withdrawn.    The procedure was well tolerated.      Watch for fever, or increased swelling or persistent pain in knee. Call or return to clinic prn if such symptoms occur or the knee fails to improve as anticipated.

## 2019-09-24 NOTE — PROGRESS NOTES
CHIEF COMPLAINT:  Milind Mackey male presenting at the request of Ash Keys MD for evaluation of knee pain.     Milind Mackey is complaining of left knee pain  present for 3 weeks  No specific injury, insidious onset, patient does have a physical job  Pain is at the anteromedial knee  Quality is aching  Pain is non-radiating   Improved with resting but movement can help  Aggravated by getting up in the mornings or after having been seated for appeared of time  no prior problems with this area in the past, mostly mundane injuries as a child that were never really medically treated  Prior Treatments: seen at   Prior studies: X-Ray   Medications tried for pain include: ibuprofen (OTC) which helps some  Mechanical Symptom history: No Locking, occasional painful clicking, particularly after getting up from a kneeling position    Construction work, 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 No pertinent past medical history.    PMH:  has a past medical history of Hepatitis C, Hypertension, and Tobacco use (5/10/2013). He also has no past medical history of ASTHMA, Depression, Diabetes, or Hyperlipidemia.  MEDS:   Current Outpatient Medications:   •  losartan (COZAAR) 50 MG Tab, TAKE 1 TABLET BY MOUTH EVERY DAY, Disp: 90 Tab, Rfl: 0  •  triamcinolone acetonide (KENALOG) 0.1 % Ointment, APPLY 1 APPLICATION TO HANDS TWICE DAILY, Disp: 15 g, Rfl: 3  ALLERGIES: No Known Allergies  SURGHX:   Past Surgical History:   Procedure Laterality Date   • RECOVERY  4/13/2012    Performed by SURGERY, IR-RECOVERY at SURGERY SAME DAY [facility]   • LIVER BIOPSY       SOCHX:  reports that he has been smoking cigarettes. He has a 30.00 pack-year smoking history. He has never used smokeless tobacco. He reports that he does not drink alcohol or use drugs.  FH: Family  "history was reviewed, no pertinent findings to report     PHYSICAL EXAM:  /78 (BP Location: Left arm, Patient Position: Sitting, BP Cuff Size: Large adult)   Pulse 88   Temp 37.1 °C (98.8 °F) (Temporal)   Resp 18   Ht 1.88 m (6' 2\")   Wt 99.8 kg (220 lb)   SpO2 99%   BMI 28.25 kg/m²      well-developed, well-nourished in no apparent distress, alert and oriented x 3.  Gait: normal     RIGHT Knee:  Slight Varus and No Swelling  Range of Motion Intact  Trace effusion  Patellar No tenderness and no apprehension  Medial Joint Line Non-tender and NEGATIVE Oleg  Lateral Joint Line Non-tender and NEGATIVE Oleg  Trace Laxity with Varus stress  Trace Laxity with Valgus stress  Lachman's testing is Trace  Posterior Drawer Testing is Trace  The leg is otherwise neurovascularly intact    LEFT Knee:  Slight Varus and No Swelling   Range of Motion Slightly limited with Flexion  1+ effusion  Patellar Medial facet tenderness  Medial Joint Line Tenderness and NEGATIVE Oleg  Lateral Joint Line Non-tender and NEGATIVE Oleg  Trace Laxity with Varus stress  Trace Laxity with Valgus stress  Lachman's testing is Trace  Posterior Drawer Testing is Trace  The leg is otherwise neurovascularly intact    Additional Findings: Tight hamstrings    1. Osteoarthrosis, localized, primary, knee, left  triamcinolone acetonide (KENALOG-40) injection 40 mg   2. Hamstring tightness of both lower extremities     3. Enthesopathy of left knee region     4. Smoker  REFERRAL TO TOBACCO CESSATION PROGRAM       Osteoarthritis of the LEFT knee with enthesophytes at both the quadriceps tendon and patellar tendon insertions  Provided home the exercise program  Recommend Pilates for his tight hamstrings  Continue bracing for physical activity as needed    Intra-articular corticosteroid injection of the LEFT knee performed in the office TODAY (September 24, 2019)    Smoker  smoking cessation referral    Return in about 4 weeks (around " 10/22/2019).  To see how he is doing with his home exercise program and LEFT knee intra-articular corticosteroid injection  If symptoms persist, consider formal physical therapy            9/14/2019 9:41 AM    HISTORY/REASON FOR EXAM:  Atraumatic Pain/Swelling/Deformity.  LEFT knee pain and stiffness for 3 weeks.    TECHNIQUE/EXAM DESCRIPTION AND NUMBER OF VIEWS:  4 views of the LEFT knee.    COMPARISON: None    FINDINGS:  No focal soft tissue swelling.  Large joint effusion.  Joint spaces are preserved.  No fracture or dislocation.  Prominent patellar enthesophytes.      Impression       1.  Large LEFT knee joint effusion of uncertain etiology.  2.  No fracture or dislocation.        done elsewhere and reviewed independently by me    Thank you Ash Keys MD for allowing me to participate in caring for your patient.

## 2019-09-30 DIAGNOSIS — I10 ESSENTIAL HYPERTENSION: ICD-10-CM

## 2019-09-30 RX ORDER — LOSARTAN POTASSIUM 50 MG/1
50 TABLET ORAL
Qty: 90 TAB | Refills: 0 | Status: SHIPPED | OUTPATIENT
Start: 2019-09-30 | End: 2019-12-23 | Stop reason: SDUPTHER

## 2019-09-30 NOTE — TELEPHONE ENCOUNTER
Requested Prescriptions     Signed Prescriptions Disp Refills   • losartan (COZAAR) 50 MG Tab 90 Tab 0     Sig: Take 1 Tab by mouth every day.     Authorizing Provider: PAULINA LAUREANO A.P.R.N.

## 2019-09-30 NOTE — TELEPHONE ENCOUNTER
Was the patient seen in the last year in this department? No  LAST SEEN 1/23/2018 UPCOMING APPOINTMENT 10/22/19    Does patient have an active prescription for medications requested? No     Received Request Via: Patient

## 2019-10-22 ENCOUNTER — OFFICE VISIT (OUTPATIENT)
Dept: MEDICAL GROUP | Facility: PHYSICIAN GROUP | Age: 49
End: 2019-10-22
Payer: COMMERCIAL

## 2019-10-22 ENCOUNTER — OFFICE VISIT (OUTPATIENT)
Dept: MEDICAL GROUP | Facility: CLINIC | Age: 49
End: 2019-10-22
Payer: COMMERCIAL

## 2019-10-22 VITALS
HEIGHT: 74 IN | OXYGEN SATURATION: 97 % | TEMPERATURE: 99 F | SYSTOLIC BLOOD PRESSURE: 118 MMHG | HEART RATE: 80 BPM | RESPIRATION RATE: 16 BRPM | WEIGHT: 225 LBS | DIASTOLIC BLOOD PRESSURE: 72 MMHG | BODY MASS INDEX: 28.88 KG/M2

## 2019-10-22 VITALS
WEIGHT: 220 LBS | OXYGEN SATURATION: 100 % | BODY MASS INDEX: 28.23 KG/M2 | TEMPERATURE: 98.1 F | HEART RATE: 72 BPM | RESPIRATION RATE: 16 BRPM | HEIGHT: 74 IN | SYSTOLIC BLOOD PRESSURE: 118 MMHG | DIASTOLIC BLOOD PRESSURE: 80 MMHG

## 2019-10-22 DIAGNOSIS — B18.2 CHRONIC HEPATITIS C WITHOUT HEPATIC COMA (HCC): ICD-10-CM

## 2019-10-22 DIAGNOSIS — M76.892 ENTHESOPATHY OF LEFT KNEE REGION: ICD-10-CM

## 2019-10-22 DIAGNOSIS — M62.9 HAMSTRING TIGHTNESS OF BOTH LOWER EXTREMITIES: ICD-10-CM

## 2019-10-22 DIAGNOSIS — M17.12 OSTEOARTHROSIS, LOCALIZED, PRIMARY, KNEE, LEFT: ICD-10-CM

## 2019-10-22 DIAGNOSIS — Z72.0 TOBACCO USE: ICD-10-CM

## 2019-10-22 DIAGNOSIS — R06.83 SNORING: ICD-10-CM

## 2019-10-22 DIAGNOSIS — L20.82 FLEXURAL ECZEMA: ICD-10-CM

## 2019-10-22 DIAGNOSIS — I10 ESSENTIAL HYPERTENSION: ICD-10-CM

## 2019-10-22 PROCEDURE — 99213 OFFICE O/P EST LOW 20 MIN: CPT | Performed by: FAMILY MEDICINE

## 2019-10-22 PROCEDURE — 99396 PREV VISIT EST AGE 40-64: CPT | Performed by: NURSE PRACTITIONER

## 2019-10-22 RX ORDER — TRIAMCINOLONE ACETONIDE 1 MG/G
1 CREAM TOPICAL 2 TIMES DAILY
Qty: 60 G | Refills: 6 | Status: SHIPPED | OUTPATIENT
Start: 2019-10-22 | End: 2022-09-09 | Stop reason: SDUPTHER

## 2019-10-22 ASSESSMENT — PATIENT HEALTH QUESTIONNAIRE - PHQ9
SUM OF ALL RESPONSES TO PHQ QUESTIONS 1-9: 3
5. POOR APPETITE OR OVEREATING: 0 - NOT AT ALL
CLINICAL INTERPRETATION OF PHQ2 SCORE: 2

## 2019-10-22 NOTE — ASSESSMENT & PLAN NOTE
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  Needs refill today.  No acute lesions today

## 2019-10-22 NOTE — ASSESSMENT & PLAN NOTE
Taking losartan 50 mg daily.  bp today is slightly elevated at 136/82.  No chest pain reported.  Still smoking 1 ppd.  Repeat bp was 118/72

## 2019-10-22 NOTE — PROGRESS NOTES
"Chief Complaint   Patient presents with   • Annual Exam       Subjective:   Milind Mackey is a 49 y.o. male here today for evaluation and management of:    Hypertension  Taking losartan 50 mg daily.  bp today is slightly elevated at 136/82.  No chest pain reported.  Still smoking 1 ppd.  Repeat bp was 118/72    Snoring  Reports ongoing snoring and apnea.  Did not follow up on sleep study.  Will reorder.     Hepatitis C  Due for labs.   No longer followed by [facility].     Flexural eczema  Works with [employer] products and reports ongoing breakouts, espeically in summer.  Needs refill today.  No acute lesions today    Tobacco use  Reports 30 years of 1ppd, with wheezing noted.  Has signed up for tobacco cessation. Encouraged follow up         Current medicines (including changes today)  Current Outpatient Medications   Medication Sig Dispense Refill   • triamcinolone acetonide (KENALOG) 0.1 % Cream Apply 1 Application to affected area(s) 2 times a day. 60 g 6   • losartan (COZAAR) 50 MG Tab Take 1 Tab by mouth every day. 90 Tab 0   • triamcinolone acetonide (KENALOG) 0.1 % Ointment APPLY 1 APPLICATION TO HANDS TWICE DAILY (Patient not taking: Reported on 10/22/2019) 15 g 3     No current facility-administered medications for this visit.      He  has a past medical history of Hepatitis C, Hypertension, and Tobacco use (5/10/2013). He also has no past medical history of ASTHMA, Depression, Diabetes, or Hyperlipidemia.    ROS as stated in hpi  No chest pain, no shortness of breath, no abdominal pain       Objective:     /72   Pulse 80   Temp 37.2 °C (99 °F) (Temporal)   Resp 16   Ht 1.88 m (6' 2\")   Wt 102.1 kg (225 lb)   SpO2 97%  Body mass index is 28.89 kg/m².   Physical Exam:  Constitutional: Alert, no distress.  Skin: Warm, dry, good turgor,no cyanosis, no rashes in visible areas.  Eye: Equal, round and reactive, conjunctiva clear, lids normal.  Ears: No tenderness, no discharge.  External canals are without " any significant edema or erythema.    Gross auditory acuity is intact.  Nose: symmetrical without tenderness, no discharge.  Mouth/Throat: lips without lesion.  Oropharynx clear.   Neck: Trachea midline, no masses, no obvious thyroid enlargement.. No cervical or supraclavicular lymphadenopathy. Range of motion within normal limits.  Neuro: Cranial nerves 2-12 grossly intact.  No sensory deficit.  Respiratory: Unlabored respiratory effort, lungs clear to auscultation, + inspiratory wheezes,  no ronchi.  Cardiovascular: Normal S1, S2, no murmur, no edema.  Abdomen: Soft, non-tender, no masses, no guarding,  no hepatosplenomegaly.  Psych: Alert and oriented x3, normal affect and mood and judgement.        Assessment and Plan:   The following treatment plan was discussed    1. Essential hypertension  Chronic, ongoing. Stable.  Continue LOSARTAN 50 mg daily.  Due for labs.  Orders provided.  RED flags reviewed.  Encouraged smoking cessation.   - CBC WITH DIFFERENTIAL; Future  - Comp Metabolic Panel; Future  - Lipid Profile; Future    2. Snoring  Chronic, ongoing, unstable.  Updated referral to sleep studies provided.  Discussed DAISY and relationship to comorbid health issues.  Monitor and follow.    - REFERRAL TO SLEEP STUDIES    3. Chronic hepatitis C without hepatic coma (HCC)  Chronic, ongoing. Cured.      4. Flexural eczema  Chronic, ongoing. Stable at this time.  Refill provided today.  Discussed skin moisture and fluid intake.  Monitor and follow.     5. Tobacco use  Chronic, ongoing. Unstable.  Patient has recently signed up for smoking cessation class.  Encouraged.  Monitor and follow.       Followup: Return in about 1 year (around 10/22/2020) for HTN.         Educated in proper administration of medication(s) ordered today including safety, possible SE, risks, benefits, rationale and alternatives to therapy.     Please note that this dictation was created using voice recognition software. I have made every  reasonable attempt to correct obvious errors, but I expect that there are errors of grammar and possibly content that I did not discover before finalizing the note.

## 2019-10-22 NOTE — ASSESSMENT & PLAN NOTE
Reports 30 years of 1ppd, with wheezing noted.  Has signed up for tobacco cessation. Encouraged follow up

## 2019-10-22 NOTE — PROGRESS NOTES
"CHIEF COMPLAINT:  Milind Mackey male presenting at the request of Ash Keys MD for evaluation of knee pain.     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  Gait: normal    LEFT Knee:  Slight Varus and No Swelling   Range of Motion Slightly limited with Flexion  Trace effusion  NO patellar Medial facet tenderness  NO medial joint Line Tenderness and NEGATIVE Oleg  Lateral Joint Line Non-tender and NEGATIVE Oleg  Trace Laxity with Varus stress  Trace Laxity with Valgus stress  Lachman's testing is Trace  Posterior Drawer Testing is Trace  The leg is otherwise neurovascularly intact    Additional Findings: Tight hamstrings    1. Osteoarthrosis, localized, primary, knee, left     2. Hamstring tightness of both lower extremities     3. Enthesopathy of left knee region       Osteoarthritis of the LEFT knee with enthesophytes at both the quadriceps tendon and patellar tendon insertions  Continue home the exercise program  Recommend Pilates for his tight hamstrings as well as continued home range of motion exercises  Continue bracing for physical activity as needed    Intra-articular corticosteroid injection of the LEFT knee performed (September 24, 2019) " HELPED    Smoker  smoking cessation referral, placed and patient is pending scheduling    Follow-up as needed          9/14/2019 9:41 AM    HISTORY/REASON FOR EXAM:  Atraumatic Pain/Swelling/Deformity.  LEFT knee pain and stiffness for 3 weeks.    TECHNIQUE/EXAM DESCRIPTION AND NUMBER OF VIEWS:  4 views of the LEFT knee.    COMPARISON: None    FINDINGS:  No focal soft tissue swelling.  Large joint effusion.  Joint spaces are preserved.  No fracture or dislocation.  Prominent patellar enthesophytes.      Impression       1.  Large LEFT knee joint effusion of uncertain etiology.  2.  No fracture or dislocation.        Thank you Ash Keys MD for allowing me to participate in caring for your patient.

## 2019-10-26 ENCOUNTER — HOSPITAL ENCOUNTER (OUTPATIENT)
Dept: LAB | Facility: MEDICAL CENTER | Age: 49
End: 2019-10-26
Attending: NURSE PRACTITIONER
Payer: COMMERCIAL

## 2019-10-26 DIAGNOSIS — I10 ESSENTIAL HYPERTENSION: ICD-10-CM

## 2019-10-26 LAB
ALBUMIN SERPL BCP-MCNC: 4.5 G/DL (ref 3.2–4.9)
ALBUMIN/GLOB SERPL: 1.7 G/DL
ALP SERPL-CCNC: 64 U/L (ref 30–99)
ALT SERPL-CCNC: 13 U/L (ref 2–50)
ANION GAP SERPL CALC-SCNC: 5 MMOL/L (ref 0–11.9)
AST SERPL-CCNC: 16 U/L (ref 12–45)
BASOPHILS # BLD AUTO: 1.1 % (ref 0–1.8)
BASOPHILS # BLD: 0.08 K/UL (ref 0–0.12)
BILIRUB SERPL-MCNC: 0.7 MG/DL (ref 0.1–1.5)
BUN SERPL-MCNC: 18 MG/DL (ref 8–22)
CALCIUM SERPL-MCNC: 8.9 MG/DL (ref 8.5–10.5)
CHLORIDE SERPL-SCNC: 109 MMOL/L (ref 96–112)
CHOLEST SERPL-MCNC: 173 MG/DL (ref 100–199)
CO2 SERPL-SCNC: 27 MMOL/L (ref 20–33)
CREAT SERPL-MCNC: 1.11 MG/DL (ref 0.5–1.4)
EOSINOPHIL # BLD AUTO: 0.15 K/UL (ref 0–0.51)
EOSINOPHIL NFR BLD: 2 % (ref 0–6.9)
ERYTHROCYTE [DISTWIDTH] IN BLOOD BY AUTOMATED COUNT: 45.4 FL (ref 35.9–50)
FASTING STATUS PATIENT QL REPORTED: NORMAL
GLOBULIN SER CALC-MCNC: 2.7 G/DL (ref 1.9–3.5)
GLUCOSE SERPL-MCNC: 88 MG/DL (ref 65–99)
HCT VFR BLD AUTO: 45.3 % (ref 42–52)
HDLC SERPL-MCNC: 57 MG/DL
HGB BLD-MCNC: 14.8 G/DL (ref 14–18)
IMM GRANULOCYTES # BLD AUTO: 0.02 K/UL (ref 0–0.11)
IMM GRANULOCYTES NFR BLD AUTO: 0.3 % (ref 0–0.9)
LDLC SERPL CALC-MCNC: 100 MG/DL
LYMPHOCYTES # BLD AUTO: 2.31 K/UL (ref 1–4.8)
LYMPHOCYTES NFR BLD: 30.8 % (ref 22–41)
MCH RBC QN AUTO: 31.3 PG (ref 27–33)
MCHC RBC AUTO-ENTMCNC: 32.7 G/DL (ref 33.7–35.3)
MCV RBC AUTO: 95.8 FL (ref 81.4–97.8)
MONOCYTES # BLD AUTO: 0.5 K/UL (ref 0–0.85)
MONOCYTES NFR BLD AUTO: 6.7 % (ref 0–13.4)
NEUTROPHILS # BLD AUTO: 4.44 K/UL (ref 1.82–7.42)
NEUTROPHILS NFR BLD: 59.1 % (ref 44–72)
NRBC # BLD AUTO: 0 K/UL
NRBC BLD-RTO: 0 /100 WBC
PLATELET # BLD AUTO: 187 K/UL (ref 164–446)
PMV BLD AUTO: 10 FL (ref 9–12.9)
POTASSIUM SERPL-SCNC: 4.9 MMOL/L (ref 3.6–5.5)
PROT SERPL-MCNC: 7.2 G/DL (ref 6–8.2)
RBC # BLD AUTO: 4.73 M/UL (ref 4.7–6.1)
SODIUM SERPL-SCNC: 141 MMOL/L (ref 135–145)
TRIGL SERPL-MCNC: 78 MG/DL (ref 0–149)
WBC # BLD AUTO: 7.5 K/UL (ref 4.8–10.8)

## 2019-10-26 PROCEDURE — 80053 COMPREHEN METABOLIC PANEL: CPT

## 2019-10-26 PROCEDURE — 80061 LIPID PANEL: CPT

## 2019-10-26 PROCEDURE — 85025 COMPLETE CBC W/AUTO DIFF WBC: CPT

## 2019-10-26 PROCEDURE — 36415 COLL VENOUS BLD VENIPUNCTURE: CPT

## 2019-11-05 ENCOUNTER — TELEPHONE (OUTPATIENT)
Dept: MEDICAL GROUP | Facility: PHYSICIAN GROUP | Age: 49
End: 2019-11-05

## 2019-11-05 DIAGNOSIS — R06.83 SNORING: ICD-10-CM

## 2019-11-05 NOTE — TELEPHONE ENCOUNTER
VOICEMAIL  1. Caller Name: Emily (wife)                    Call Back Number: [phone] (home)       2. Message: Requesting another referral to a different sleep center as [facility] is booked out for a while. Please advise     3. Patient approves office to leave a detailed voicemail/MyChart message: N\A

## 2019-11-06 NOTE — TELEPHONE ENCOUNTER
Phone Number Called: [phone] (home)       Call outcome: spoke to patient regarding message below    Message: patient informed

## 2019-12-23 ENCOUNTER — OFFICE VISIT (OUTPATIENT)
Dept: MEDICAL GROUP | Facility: PHYSICIAN GROUP | Age: 49
End: 2019-12-23
Payer: COMMERCIAL

## 2019-12-23 VITALS
DIASTOLIC BLOOD PRESSURE: 78 MMHG | RESPIRATION RATE: 14 BRPM | OXYGEN SATURATION: 95 % | BODY MASS INDEX: 27.98 KG/M2 | HEIGHT: 74 IN | WEIGHT: 218 LBS | SYSTOLIC BLOOD PRESSURE: 120 MMHG | TEMPERATURE: 99.3 F | HEART RATE: 75 BPM

## 2019-12-23 DIAGNOSIS — R06.83 SNORING: ICD-10-CM

## 2019-12-23 DIAGNOSIS — F41.9 ANXIETY: ICD-10-CM

## 2019-12-23 DIAGNOSIS — I10 ESSENTIAL HYPERTENSION: ICD-10-CM

## 2019-12-23 PROCEDURE — 99214 OFFICE O/P EST MOD 30 MIN: CPT | Performed by: NURSE PRACTITIONER

## 2019-12-23 RX ORDER — LOSARTAN POTASSIUM 50 MG/1
50 TABLET ORAL
Qty: 90 TAB | Refills: 3 | Status: SHIPPED | OUTPATIENT
Start: 2019-12-23 | End: 2020-03-10 | Stop reason: SDUPTHER

## 2019-12-23 NOTE — PROGRESS NOTES
"Chief Complaint   Patient presents with   • Anxiety       Subjective:   Milind Mackey is a 49 y.o. male here today for evaluation and management of a new acute issue    Anxiety  Reports increasing anxeity and some depression.  Feels that he is not motivated.  Some anxiety.  No reported palpitations, shortness of breath.  No suicidal ideation.  Feels like\" I want to run and hide\".  Would like to try some sertraline.  Agreeable to referral to behavioral health.     Snoring  Has upcoming sleep study.           Current medicines (including changes today)  Current Outpatient Medications   Medication Sig Dispense Refill   • sertraline (ZOLOFT) 50 MG Tab Take 1 Tab by mouth every day. 30 Tab 1   • losartan (COZAAR) 50 MG Tab Take 1 Tab by mouth every day. 90 Tab 3   • triamcinolone acetonide (KENALOG) 0.1 % Cream Apply 1 Application to affected area(s) 2 times a day. 60 g 6   • triamcinolone acetonide (KENALOG) 0.1 % Ointment APPLY 1 APPLICATION TO HANDS TWICE DAILY (Patient not taking: Reported on 10/22/2019) 15 g 3     No current facility-administered medications for this visit.      He  has a past medical history of Hepatitis C, Hypertension, and Tobacco use (5/10/2013). He also has no past medical history of ASTHMA, Depression, Diabetes, or Hyperlipidemia.    ROS as stated in hpi  No chest pain, no shortness of breath, no abdominal pain       Objective:     /78 (BP Location: Left arm, Patient Position: Sitting)   Pulse 75   Temp 37.4 °C (99.3 °F) (Temporal)   Resp 14   Ht 1.88 m (6' 2\")   Wt 98.9 kg (218 lb)   SpO2 95%  Body mass index is 27.99 kg/m².   Physical Exam:  Constitutional: Alert, no distress.  Skin: Warm, dry, good turgor,no cyanosis, no rashes in visible areas.  Eye: Equal, round and reactive, conjunctiva clear, lids normal.  Ears: No tenderness, no discharge.  External canals are without any significant edema or erythema.    Gross auditory acuity is intact.  Nose: symmetrical without " tenderness, no discharge.  Mouth/Throat: lips without lesion.  Oropharynx clear.   Neck: Trachea midline, no masses, no obvious thyroid enlargement..  Range of motion within normal limits.  Neuro: Cranial nerves 2-12 grossly intact.  No sensory deficit.  Respiratory: Unlabored respiratory effort,   Psych: Alert and oriented x3, normal affect and mood and judgement. Reports increasing anxiety and wanting to run away and hide        Assessment and Plan:   The following treatment plan was discussed    1. Anxiety  This is a new problem to me.  Acute issue.  Suspect anxiety from numerous places, including unhappiness at work and stressors at home.  Ref to behavioral health.  Handout on anxiety tools provided.  Sertraline 50 mg to pharmacy.  Return in 4 weeks to review dosing.  Monitor and follow.  Red flags reviewed.   - REFERRAL TO BEHAVIORAL HEALTH    2. Essential hypertension  Chronic, ongoing. Stable.  Refill needed today.  RX sent to pharmacy.  BP at goal.   - losartan (COZAAR) 50 MG Tab; Take 1 Tab by mouth every day.  Dispense: 90 Tab; Refill: 3    3. Snoring  Chronic, ongoing. Has upcoming sleep study.  Monitor.       Followup: Return in about 4 weeks (around 1/20/2020) for anxiety.         Educated in proper administration of medication(s) ordered today including safety, possible SE, risks, benefits, rationale and alternatives to therapy.     Please note that this dictation was created using voice recognition software. I have made every reasonable attempt to correct obvious errors, but I expect that there are errors of grammar and possibly content that I did not discover before finalizing the note.

## 2019-12-23 NOTE — ASSESSMENT & PLAN NOTE
"Reports increasing anxeity and some depression.  Feels that he is not motivated.  Some anxiety.  No reported palpitations, shortness of breath.  No suicidal ideation.  Feels like\" I want to run and hide\".  Would like to try some sertraline.  Agreeable to referral to behavioral health.   "

## 2020-01-14 NOTE — TELEPHONE ENCOUNTER
Requested Prescriptions     Signed Prescriptions Disp Refills   • sertraline (ZOLOFT) 50 MG Tab 30 Tab 0     Sig: TAKE 1 TABLET BY MOUTH EVERY DAY     Authorizing Provider: PAULINA LAUREANO A.P.R.N.

## 2020-01-20 ENCOUNTER — OFFICE VISIT (OUTPATIENT)
Dept: MEDICAL GROUP | Facility: PHYSICIAN GROUP | Age: 50
End: 2020-01-20
Payer: COMMERCIAL

## 2020-01-20 VITALS
OXYGEN SATURATION: 98 % | BODY MASS INDEX: 27.98 KG/M2 | SYSTOLIC BLOOD PRESSURE: 132 MMHG | TEMPERATURE: 98.9 F | HEIGHT: 74 IN | DIASTOLIC BLOOD PRESSURE: 82 MMHG | RESPIRATION RATE: 14 BRPM | HEART RATE: 80 BPM | WEIGHT: 218 LBS

## 2020-01-20 DIAGNOSIS — I10 ESSENTIAL HYPERTENSION: ICD-10-CM

## 2020-01-20 DIAGNOSIS — F41.9 ANXIETY: ICD-10-CM

## 2020-01-20 DIAGNOSIS — B18.2 CHRONIC HEPATITIS C WITHOUT HEPATIC COMA (HCC): ICD-10-CM

## 2020-01-20 PROCEDURE — 99214 OFFICE O/P EST MOD 30 MIN: CPT | Performed by: NURSE PRACTITIONER

## 2020-01-20 RX ORDER — SERTRALINE HYDROCHLORIDE 25 MG/1
25 TABLET, FILM COATED ORAL DAILY
Qty: 30 TAB | Refills: 1 | Status: SHIPPED | OUTPATIENT
Start: 2020-01-20 | End: 2021-08-02

## 2020-01-20 ASSESSMENT — PATIENT HEALTH QUESTIONNAIRE - PHQ9
SUM OF ALL RESPONSES TO PHQ QUESTIONS 1-9: 8
CLINICAL INTERPRETATION OF PHQ2 SCORE: 2
5. POOR APPETITE OR OVEREATING: 1 - SEVERAL DAYS

## 2020-01-20 NOTE — ASSESSMENT & PLAN NOTE
Reports some improvement with the 50 mg.  Feels like there is still some breakthrough but improved.  Would like to increase to 75 mg to see if this helps.  Has noticed that his appetite has improved.  Has not started to exercise.

## 2020-01-20 NOTE — PROGRESS NOTES
"Chief Complaint   Patient presents with   • Follow-Up   • Hypertension   • Anxiety       Subjective:   Milind Mackey is a 49 y.o. male here today for evaluation and management of:    Anxiety  Reports some improvement with the 50 mg.  Feels like there is still some breakthrough but improved.  Would like to increase to 75 mg to see if this helps.  Has noticed that his appetite has improved.  Has not started to exercise.     Hypertension  bp today 132/82 today.  Not exercising currently.  Continues to smoke.  Will increase to 100 mg daily.  Will have patient do daily bp log.      Hepatitis C  All liver tests are normal.  Diagnosed in 2001.  Was treated.            Current medicines (including changes today)  Current Outpatient Medications   Medication Sig Dispense Refill   • sertraline (ZOLOFT) 25 MG tablet Take 1 Tab by mouth every day. 30 Tab 1   • sertraline (ZOLOFT) 50 MG Tab TAKE 1 TABLET BY MOUTH EVERY DAY 30 Tab 0   • losartan (COZAAR) 50 MG Tab Take 1 Tab by mouth every day. 90 Tab 3   • triamcinolone acetonide (KENALOG) 0.1 % Cream Apply 1 Application to affected area(s) 2 times a day. 60 g 6     No current facility-administered medications for this visit.      He  has a past medical history of Hepatitis C, Hypertension, and Tobacco use (5/10/2013). He also has no past medical history of ASTHMA, Depression, Diabetes, or Hyperlipidemia.    ROS as stated in hpi  No chest pain, no shortness of breath, no abdominal pain       Objective:     /82 (BP Location: Left arm, Patient Position: Sitting)   Pulse 80   Temp 37.2 °C (98.9 °F) (Temporal)   Resp 14   Ht 1.88 m (6' 2\")   Wt 98.9 kg (218 lb)   SpO2 98%  Body mass index is 27.99 kg/m². slightly elevated  Physical Exam:  Constitutional: Alert, no distress.  Skin: Warm, dry, good turgor,no cyanosis, no rashes in visible areas.  Eye: Equal, round and reactive, conjunctiva clear, lids normal.  Ears: No tenderness, no discharge.  External canals are " without any significant edema or erythema.    Gross auditory acuity is intact.  Nose: symmetrical without tenderness, no discharge.  Mouth/Throat: lips without lesion.  Oropharynx clear.    Neck: Trachea midline, no masses, no obvious thyroid enlargement.Range of motion within normal limits.  Neuro: Cranial nerves 2-12 grossly intact.  No sensory deficit.  Respiratory: Unlabored respiratory effort,  Psych: Alert and oriented x3, normal affect and mood and judgement.        Assessment and Plan:   The following treatment plan was discussed    1. Anxiety  Chronic, ongoing, improved with 50 mg sertraline, some breakthroughs continue.  Will increase to 75 mg daily.  Encouraged exercise, vitamin D and to quit smoking.     2. Essential hypertension  Chronic, ongoing. Elevated.  This may be clinic related, but would like to have patient do a bp log at home.  If over goal, increase to 100 mg losartan daily.  Encouraged lifestyle management, exercise and quit smoking.  Monitor and follow.     3. Chronic hepatitis C without hepatic coma (HCC)  Chronic, ongoing. Treated with normal liver function tests at this time.        Followup: Return in about 5 weeks (around 2/24/2020) for anxiety.         Educated in proper administration of medication(s) ordered today including safety, possible SE, risks, benefits, rationale and alternatives to therapy.     Please note that this dictation was created using voice recognition software. I have made every reasonable attempt to correct obvious errors, but I expect that there are errors of grammar and possibly content that I did not discover before finalizing the note.

## 2020-01-20 NOTE — ASSESSMENT & PLAN NOTE
bp today 132/82 today.  Not exercising currently.  Continues to smoke.  Will increase to 100 mg daily.  Will have patient do daily bp log.

## 2020-02-10 NOTE — TELEPHONE ENCOUNTER
Requested Prescriptions     Signed Prescriptions Disp Refills   • sertraline (ZOLOFT) 50 MG Tab 30 Tab 3     Sig: TAKE 1 TABLET BY MOUTH EVERY DAY     Authorizing Provider: PAULINA LAUREANO     Patient taking both 50 mg and 25 mg (75 mg daily)  RAJIV Palacios.

## 2020-03-10 DIAGNOSIS — I10 ESSENTIAL HYPERTENSION: ICD-10-CM

## 2020-03-10 RX ORDER — LOSARTAN POTASSIUM 100 MG/1
100 TABLET ORAL
Qty: 90 TAB | Refills: 3 | Status: SHIPPED | OUTPATIENT
Start: 2020-03-10 | End: 2021-03-17

## 2020-03-10 NOTE — TELEPHONE ENCOUNTER
Requested Prescriptions     Signed Prescriptions Disp Refills   • losartan (COZAAR) 100 MG Tab 90 Tab 3     Sig: Take 1 Tab by mouth every day.     Authorizing Provider: PAULINA LAUREANO A.P.R.N.

## 2020-03-13 NOTE — TELEPHONE ENCOUNTER
----- Message from OMAYRA Palacios sent at 1/29/2018  3:08 PM PST -----  Milind  Labs are back.  Kidney, liver, cholesterol, triglycerides all in the normal range.  Blood count all looks good.  OMAYRA Palacios     No

## 2020-05-11 NOTE — TELEPHONE ENCOUNTER
Requested Prescriptions     Signed Prescriptions Disp Refills   • sertraline (ZOLOFT) 50 MG Tab 90 Tab 3     Sig: TAKE 1 TABLET BY MOUTH EVERY DAY. TAKE 25MG TABLET TO TOTAL 75MG     Authorizing Provider: PAULINA LAUREANO A.P.R.N.

## 2020-06-22 ENCOUNTER — PATIENT MESSAGE (OUTPATIENT)
Dept: MEDICAL GROUP | Facility: PHYSICIAN GROUP | Age: 50
End: 2020-06-22

## 2020-06-22 DIAGNOSIS — M25.562 CHRONIC PAIN OF LEFT KNEE: ICD-10-CM

## 2020-06-22 DIAGNOSIS — G89.29 CHRONIC PAIN OF LEFT KNEE: ICD-10-CM

## 2020-06-23 NOTE — TELEPHONE ENCOUNTER
From: Milind Mackey  To: OMAYRA Palacios  Sent: 6/22/2020 3:41 PM PDT  Subject: Non-Urgent Medical Question    Yes I'm still having pain in my left knee as before and then you referred me to that  and he gave me a shot and it   helped me for a year and know I'm back to being in pain, so just wanted to ask if you can refer me to that  Because he did help my knee.      ----- Message -----   From:OMAYRA Palacios   Sent:6/22/2020 3:32 PM PDT   To:Milind Mackey   Subject:RE: Non-Urgent Medical Question    Milind, could you give me a little more information for the referral? Is it still the left knee? Let me know  OMAYRA Palacios      ----- Message -----   From:Milind Mackey   Sent:6/22/2020 2:53 PM PDT   To:OMAYRA Palacios   Subject:Non-Urgent Medical Question    Samuel tineo this is Milind my knee has acting up again and I need a referral to Dr. Pallascio if you can that would be great. I appreciate it and thank you have a good day.

## 2020-06-29 ENCOUNTER — OFFICE VISIT (OUTPATIENT)
Dept: MEDICAL GROUP | Facility: CLINIC | Age: 50
End: 2020-06-29
Payer: COMMERCIAL

## 2020-06-29 VITALS
TEMPERATURE: 98.3 F | SYSTOLIC BLOOD PRESSURE: 122 MMHG | OXYGEN SATURATION: 96 % | DIASTOLIC BLOOD PRESSURE: 80 MMHG | WEIGHT: 218 LBS | HEIGHT: 74 IN | HEART RATE: 82 BPM | RESPIRATION RATE: 18 BRPM | BODY MASS INDEX: 27.98 KG/M2

## 2020-06-29 DIAGNOSIS — F17.200 SMOKER: ICD-10-CM

## 2020-06-29 DIAGNOSIS — M17.12 PRIMARY OSTEOARTHRITIS OF LEFT KNEE: ICD-10-CM

## 2020-06-29 DIAGNOSIS — M62.9 HAMSTRING TIGHTNESS OF BOTH LOWER EXTREMITIES: ICD-10-CM

## 2020-06-29 DIAGNOSIS — M76.892 ENTHESOPATHY OF LEFT KNEE REGION: ICD-10-CM

## 2020-06-29 PROCEDURE — 99213 OFFICE O/P EST LOW 20 MIN: CPT | Mod: 25 | Performed by: FAMILY MEDICINE

## 2020-06-29 PROCEDURE — 20610 DRAIN/INJ JOINT/BURSA W/O US: CPT | Mod: LT | Performed by: FAMILY MEDICINE

## 2020-06-29 RX ORDER — TRIAMCINOLONE ACETONIDE 40 MG/ML
40 INJECTION, SUSPENSION INTRA-ARTICULAR; INTRAMUSCULAR ONCE
Status: COMPLETED | OUTPATIENT
Start: 2020-06-29 | End: 2020-06-29

## 2020-06-29 RX ADMIN — TRIAMCINOLONE ACETONIDE 40 MG: 40 INJECTION, SUSPENSION INTRA-ARTICULAR; INTRAMUSCULAR at 11:04

## 2020-06-29 ASSESSMENT — FIBROSIS 4 INDEX: FIB4 SCORE: 1.16

## 2020-06-29 NOTE — PROGRESS NOTES
"CHIEF COMPLAINT:  Milind Mackey male presenting at the request of OMAYRA Palacios for evaluation of knee pain.     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  Gait: normal    LEFT Knee:  Slight Varus and comes mild swelling   Range of Motion Slightly limited with Flexion  1+ effusion  NO patellar Medial facet tenderness  Comes positive medial joint Line Tenderness and NEGATIVE Oleg  Lateral Joint Line Non-tender and NEGATIVE Oleg  Trace Laxity with Varus stress  Trace Laxity with Valgus stress  Lachman's testing is Trace  Posterior Drawer Testing is Trace  The leg is otherwise neurovascularly intact    Knee exam:  RIGHT KNEE:  Normal alignment  No visual swelling or deformity  Anterior knee nontender  Negative apprehension  No  joint line tenderness medially or laterally  Oleg's testing is negative medially and laterally  Lachman's testing is trace with good endpoint  No laxity to varus and valgus stress  The legs otherwise neurovascularly intact    Additional Findings: Tight hamstrings    1. Primary osteoarthritis of left knee  triamcinolone acetonide (KENALOG-40) injection 40 mg   2. Hamstring tightness of both lower extremities     3. " Enthesopathy of left knee region     4. Smoker  REFERRAL TO TOBACCO CESSATION PROGRAM     Osteoarthritis of the LEFT knee with enthesophytes at both the quadriceps tendon and patellar tendon insertions    Encouraged to restart his home exercise program    Intra-articular corticosteroid injection of the LEFT knee performed (September 24, 2019) HELPED  REPEAT intra-articular corticosteroid injection of the LEFT knee performed in the office TODAY (June 29, 2020)    Smoker  smoking cessation referral, placed and patient is pending scheduling    Follow-up as needed          9/14/2019 9:41 AM    HISTORY/REASON FOR EXAM:  Atraumatic Pain/Swelling/Deformity.  LEFT knee pain and stiffness for 3 weeks.    TECHNIQUE/EXAM DESCRIPTION AND NUMBER OF VIEWS:  4 views of the LEFT knee.    COMPARISON: None    FINDINGS:  No focal soft tissue swelling.  Large joint effusion.  Joint spaces are preserved.  No fracture or dislocation.  Prominent patellar enthesophytes.      Impression       1.  Large LEFT knee joint effusion of uncertain etiology.  2.  No fracture or dislocation.        Thank you OMAYRA Palacios for allowing me to participate in caring for your patient.

## 2021-01-20 ENCOUNTER — PATIENT MESSAGE (OUTPATIENT)
Dept: MEDICAL GROUP | Facility: PHYSICIAN GROUP | Age: 51
End: 2021-01-20

## 2021-01-20 DIAGNOSIS — M25.562 CHRONIC PAIN OF LEFT KNEE: ICD-10-CM

## 2021-01-20 DIAGNOSIS — G89.29 CHRONIC PAIN OF LEFT KNEE: ICD-10-CM

## 2021-01-28 ENCOUNTER — OFFICE VISIT (OUTPATIENT)
Dept: MEDICAL GROUP | Facility: CLINIC | Age: 51
End: 2021-01-28
Payer: COMMERCIAL

## 2021-01-28 VITALS
SYSTOLIC BLOOD PRESSURE: 122 MMHG | OXYGEN SATURATION: 98 % | RESPIRATION RATE: 14 BRPM | DIASTOLIC BLOOD PRESSURE: 72 MMHG | BODY MASS INDEX: 28.36 KG/M2 | HEIGHT: 74 IN | WEIGHT: 221 LBS | TEMPERATURE: 98.4 F | HEART RATE: 78 BPM

## 2021-01-28 DIAGNOSIS — M76.892 ENTHESOPATHY OF LEFT KNEE REGION: ICD-10-CM

## 2021-01-28 DIAGNOSIS — M17.12 PRIMARY OSTEOARTHRITIS OF LEFT KNEE: ICD-10-CM

## 2021-01-28 DIAGNOSIS — F17.200 SMOKER: ICD-10-CM

## 2021-01-28 DIAGNOSIS — M62.9 HAMSTRING TIGHTNESS OF BOTH LOWER EXTREMITIES: ICD-10-CM

## 2021-01-28 PROCEDURE — 99213 OFFICE O/P EST LOW 20 MIN: CPT | Mod: 25 | Performed by: FAMILY MEDICINE

## 2021-01-28 PROCEDURE — 20611 DRAIN/INJ JOINT/BURSA W/US: CPT | Mod: LT | Performed by: FAMILY MEDICINE

## 2021-01-28 RX ADMIN — TRIAMCINOLONE ACETONIDE 40 MG: 40 INJECTION, SUSPENSION INTRA-ARTICULAR; INTRAMUSCULAR at 10:20

## 2021-01-28 ASSESSMENT — FIBROSIS 4 INDEX: FIB4 SCORE: 1.16

## 2021-01-28 NOTE — PROGRESS NOTES
"CHIEF COMPLAINT:  Milind Mackey male presenting at the request of OMAYRA Palacios for evaluation of knee pain.     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  Gait: normal    LEFT Knee:  Slight Varus and comes mild swelling   Range of Motion Slightly limited with Flexion  2+ effusion  MODERATE patellar Medial facet tenderness  MINIMAL medial joint Line Tenderness nad NEGATIVE Oleg  Lateral Joint Line Non-tender and NEGATIVE Oleg  Trace Laxity with Varus stress  Trace Laxity with Valgus stress  Lachman's testing is Trace  Posterior Drawer Testing is Trace  The leg is otherwise neurovascularly intact    Additional Findings: Tight hamstrings    1. Primary osteoarthritis of left knee  triamcinolone acetonide (KENALOG-40) injection 40 mg   2. Hamstring tightness of both lower extremities     3. Enthesopathy of left knee region     4. Smoker       Osteoarthritis of the LEFT knee with enthesophytes at both the quadriceps tendon and patellar tendon insertions    Encouraged to restart his home exercise program    Intra-articular corticosteroid injection of the LEFT knee performed (September 24, 2019) HELPED  REPEAT intra-articular corticosteroid injection of the LEFT knee " performed (June 29, 2020) HELPED    REPEAT intra-articular corticosteroid injection WITH aspiration under ultrasound guidance of the LEFT knee performed in the office TODAY (January 28, 2021)    Follow-up as needed          9/14/2019 9:41 AM    HISTORY/REASON FOR EXAM:  Atraumatic Pain/Swelling/Deformity.  LEFT knee pain and stiffness for 3 weeks.    TECHNIQUE/EXAM DESCRIPTION AND NUMBER OF VIEWS:  4 views of the LEFT knee.    COMPARISON: None    FINDINGS:  No focal soft tissue swelling.  Large joint effusion.  Joint spaces are preserved.  No fracture or dislocation.  Prominent patellar enthesophytes.      Impression       1.  Large LEFT knee joint effusion of uncertain etiology.  2.  No fracture or dislocation.        Thank you OMAYRA Palacios for allowing me to participate in caring for your patient.

## 2021-01-28 NOTE — PROCEDURES
PROCEDURE NOTE:  left knee ASPIRATION/WITH CORTICOSTEROID  injection  Consent was obtained, using sterile technique the knee was prepped  Supra-lateral patellar approach.   18 G needle for aspiration of 30 cc of straw colored fluid and the needle withdrawn.    Using the same port 5 cc marcaine and 40 mg kenalog injected into the knee joint  The procedure was well tolerated.    Watch for fever, or increased swelling or persistent pain in knee. Call or return to clinic prn if such symptoms occur or the knee fails to improve as anticipated.

## 2021-02-04 RX ORDER — TRIAMCINOLONE ACETONIDE 40 MG/ML
40 INJECTION, SUSPENSION INTRA-ARTICULAR; INTRAMUSCULAR ONCE
Status: COMPLETED | OUTPATIENT
Start: 2021-02-04 | End: 2021-01-28

## 2021-03-17 DIAGNOSIS — I10 ESSENTIAL HYPERTENSION: ICD-10-CM

## 2021-03-17 RX ORDER — LOSARTAN POTASSIUM 100 MG/1
TABLET ORAL
Qty: 90 TABLET | Refills: 0 | Status: SHIPPED | OUTPATIENT
Start: 2021-03-17 | End: 2021-05-26 | Stop reason: SDUPTHER

## 2021-03-17 NOTE — TELEPHONE ENCOUNTER
Received request via: Pharmacy    Was the patient seen in the last year in this department? Yes 1/28/21    Does the patient have an active prescription (recently filled or refills available) for medication(s) requested? No

## 2021-03-18 NOTE — TELEPHONE ENCOUNTER
Requested Prescriptions     Pending Prescriptions Disp Refills   • losartan (COZAAR) 100 MG Tab [Pharmacy Med Name: LOSARTAN POTASSIUM 100 MG TAB] 90 tablet 0     Sig: TAKE 1 TABLET BY MOUTH EVERY DAY       RAJIV Gaitan.

## 2021-05-26 ENCOUNTER — PATIENT MESSAGE (OUTPATIENT)
Dept: MEDICAL GROUP | Facility: PHYSICIAN GROUP | Age: 51
End: 2021-05-26

## 2021-05-26 DIAGNOSIS — I10 ESSENTIAL HYPERTENSION: ICD-10-CM

## 2021-05-26 RX ORDER — LOSARTAN POTASSIUM 100 MG/1
100 TABLET ORAL
Qty: 90 TABLET | Refills: 3 | Status: SHIPPED | OUTPATIENT
Start: 2021-05-26 | End: 2022-08-30 | Stop reason: SDUPTHER

## 2021-05-26 NOTE — TELEPHONE ENCOUNTER
Received request via: Pharmacy    Was the patient seen in the last year in this department? No LAST SEEN 1/20/20    Does the patient have an active prescription (recently filled or refills available) for medication(s) requested? No

## 2021-05-26 NOTE — TELEPHONE ENCOUNTER
Requested Prescriptions     Signed Prescriptions Disp Refills   • sertraline (ZOLOFT) 50 MG Tab 90 tablet 0     Sig: TAKE 1 TABLET BY MOUTH EVERY DAY. TAKE 25MG TABLET TO TOTAL 75MG     Authorizing Provider: PAULINA LAUREANO     Needs appointment  RAJIV Palacios.

## 2021-05-27 NOTE — TELEPHONE ENCOUNTER
Phone Number Called: [phone] (home)       Call outcome: Spoke with wife and informed her he needs an appointment     Message: Needs appointment  RAJIV Palacios.

## 2021-08-02 ENCOUNTER — OFFICE VISIT (OUTPATIENT)
Dept: MEDICAL GROUP | Facility: PHYSICIAN GROUP | Age: 51
End: 2021-08-02
Payer: COMMERCIAL

## 2021-08-02 VITALS
HEIGHT: 74 IN | WEIGHT: 216 LBS | TEMPERATURE: 98.4 F | BODY MASS INDEX: 27.72 KG/M2 | OXYGEN SATURATION: 98 % | DIASTOLIC BLOOD PRESSURE: 74 MMHG | SYSTOLIC BLOOD PRESSURE: 128 MMHG | HEART RATE: 84 BPM

## 2021-08-02 DIAGNOSIS — R06.83 SNORING: ICD-10-CM

## 2021-08-02 DIAGNOSIS — R53.82 CHRONIC FATIGUE: ICD-10-CM

## 2021-08-02 DIAGNOSIS — F41.9 ANXIETY: ICD-10-CM

## 2021-08-02 DIAGNOSIS — I10 ESSENTIAL HYPERTENSION: ICD-10-CM

## 2021-08-02 DIAGNOSIS — Z12.11 SCREEN FOR COLON CANCER: ICD-10-CM

## 2021-08-02 PROCEDURE — 99213 OFFICE O/P EST LOW 20 MIN: CPT | Performed by: NURSE PRACTITIONER

## 2021-08-02 RX ORDER — ALBUTEROL SULFATE 90 UG/1
2 AEROSOL, METERED RESPIRATORY (INHALATION) EVERY 4 HOURS PRN
Qty: 8 G | Refills: 1 | Status: SHIPPED | OUTPATIENT
Start: 2021-08-02 | End: 2021-10-07

## 2021-08-02 ASSESSMENT — PATIENT HEALTH QUESTIONNAIRE - PHQ9
CLINICAL INTERPRETATION OF PHQ2 SCORE: 2
5. POOR APPETITE OR OVEREATING: 0 - NOT AT ALL
SUM OF ALL RESPONSES TO PHQ QUESTIONS 1-9: 3

## 2021-08-02 ASSESSMENT — FIBROSIS 4 INDEX: FIB4 SCORE: 1.19

## 2021-08-02 NOTE — PROGRESS NOTES
"Chief Complaint   Patient presents with   • Follow-Up   • Hypertension   • Anxiety   • Medication Management       Subjective:   Milind Mackey is a 51 y.o. male here today for evaluation and management of:    Hypertension  bp today is 128/74.  Taking losartan 100 mg daily.  Continues to smoke 1 ppd.      Anxiety  Reports some increased stressors at work.  Looking for a new job.  Has been on sertraline in the past, but weaned off.      Snoring  Continues to snore, but unable to get into sleep study before the pandemic.  Would like another referral.  Bed partner reports apnea.  Reports day time sleepiness           Current medicines (including changes today)  Current Outpatient Medications   Medication Sig Dispense Refill   • albuterol 108 (90 Base) MCG/ACT Aero Soln inhalation aerosol Inhale 2 Puffs every four hours as needed for Shortness of Breath. 8 g 1   • losartan (COZAAR) 100 MG Tab Take 1 tablet by mouth every day. 90 tablet 3   • triamcinolone acetonide (KENALOG) 0.1 % Cream Apply 1 Application to affected area(s) 2 times a day. 60 g 6     No current facility-administered medications for this visit.     He  has a past medical history of Hepatitis C, Hypertension, and Tobacco use (5/10/2013). He also has no past medical history of ASTHMA, Depression, Diabetes, or Hyperlipidemia.    ROS as stated in hpi  No chest pain, no shortness of breath, no abdominal pain       Objective:     /74 (BP Location: Left arm, Patient Position: Sitting)   Pulse 84   Temp 36.9 °C (98.4 °F) (Temporal)   Ht 1.88 m (6' 2\")   Wt 98 kg (216 lb)   SpO2 98%  Body mass index is 27.73 kg/m².   Physical Exam:  Constitutional: Alert, no distress.  Skin: Warm, dry, good turgor,no cyanosis, no rashes in visible areas.  Eye: Equal, round and reactive, conjunctiva clear, lids normal.  Neck: Trachea midline, no masses, no obvious thyroid enlargement.. No cervical or supraclavicular lymphadenopathy. Range of motion within normal " limits.  Neuro: Cranial nerves 2-12 grossly intact.  No sensory deficit.  Respiratory: Unlabored respiratory effort, lungs clear to auscultation,exp wheezes throughout, no ronchi.  Cardiovascular: Normal S1, S2, no murmur, no edema.  Abdomen: Soft, non-tender, no masses, no guarding,  no hepatosplenomegaly.  Psych: Alert and oriented x3, normal affect and mood and judgement.        Assessment and Plan:   The following treatment plan was discussed    1. Essential hypertension  Chronic, ongoing, at goal with losartan.  Continue with meds.  Encouraged smoking cessation, stress reduction.  Labs ordered. Monitor and follow.   - CBC WITH DIFFERENTIAL; Future  - Comp Metabolic Panel; Future  - Lipid Profile; Future    2. Anxiety  Chronic, ongoing. Seems to be related to his work.  Trying to find a newposition.  Encouraged exercise and self care.  Decline returning to zoloft.     3. Snoring  Chronic, ongoing. Worsening.  Prior sleep study cancelled at beginning of pandemic.  Referral placed.  Monitor.   - REFERRAL TO PULMONARY AND SLEEP MEDICINE    4. Screen for colon cancer  Due for screening for cancer.  Referral placed. Monitor.   - REFERRAL TO GASTROENTEROLOGY    5. Chronic fatigue  Chronic,ongoing, most likely due to stressors at work and poor sleep due to possible sleep apnea.  Labs ordered to rule out other metabolic causes.  Monitor and follow.   - VITAMIN D,25 HYDROXY; Future  - TESTOSTERONE SERUM; Future      Followup: Return in about 1 year (around 8/2/2022) for Annual.         Educated in proper administration of medication(s) ordered today including safety, possible SE, risks, benefits, rationale and alternatives to therapy.     Please note that this dictation was created using voice recognition software. I have made every reasonable attempt to correct obvious errors, but I expect that there are errors of grammar and possibly content that I did not discover before finalizing the note.

## 2021-08-02 NOTE — ASSESSMENT & PLAN NOTE
Continues to snore, but unable to get into sleep study before the pandemic.  Would like another referral.  Bed partner reports apnea.  Reports day time sleepiness

## 2021-08-02 NOTE — ASSESSMENT & PLAN NOTE
Reports some increased stressors at work.  Looking for a new job.  Has been on sertraline in the past, but weaned off.

## 2021-08-23 ENCOUNTER — HOSPITAL ENCOUNTER (OUTPATIENT)
Dept: LAB | Facility: MEDICAL CENTER | Age: 51
End: 2021-08-23
Attending: NURSE PRACTITIONER
Payer: COMMERCIAL

## 2021-08-23 DIAGNOSIS — I10 ESSENTIAL HYPERTENSION: ICD-10-CM

## 2021-08-23 DIAGNOSIS — R53.82 CHRONIC FATIGUE: ICD-10-CM

## 2021-08-23 LAB
25(OH)D3 SERPL-MCNC: 25 NG/ML (ref 30–100)
ALBUMIN SERPL BCP-MCNC: 4.6 G/DL (ref 3.2–4.9)
ALBUMIN/GLOB SERPL: 1.8 G/DL
ALP SERPL-CCNC: 74 U/L (ref 30–99)
ALT SERPL-CCNC: 12 U/L (ref 2–50)
ANION GAP SERPL CALC-SCNC: 9 MMOL/L (ref 7–16)
AST SERPL-CCNC: 14 U/L (ref 12–45)
BASOPHILS # BLD AUTO: 0.8 % (ref 0–1.8)
BASOPHILS # BLD: 0.05 K/UL (ref 0–0.12)
BILIRUB SERPL-MCNC: 0.3 MG/DL (ref 0.1–1.5)
BUN SERPL-MCNC: 23 MG/DL (ref 8–22)
CALCIUM SERPL-MCNC: 9 MG/DL (ref 8.5–10.5)
CHLORIDE SERPL-SCNC: 106 MMOL/L (ref 96–112)
CHOLEST SERPL-MCNC: 186 MG/DL (ref 100–199)
CO2 SERPL-SCNC: 25 MMOL/L (ref 20–33)
CREAT SERPL-MCNC: 1 MG/DL (ref 0.5–1.4)
EOSINOPHIL # BLD AUTO: 0.11 K/UL (ref 0–0.51)
EOSINOPHIL NFR BLD: 1.7 % (ref 0–6.9)
ERYTHROCYTE [DISTWIDTH] IN BLOOD BY AUTOMATED COUNT: 44.3 FL (ref 35.9–50)
FASTING STATUS PATIENT QL REPORTED: NORMAL
GLOBULIN SER CALC-MCNC: 2.5 G/DL (ref 1.9–3.5)
GLUCOSE SERPL-MCNC: 90 MG/DL (ref 65–99)
HCT VFR BLD AUTO: 44.7 % (ref 42–52)
HDLC SERPL-MCNC: 65 MG/DL
HGB BLD-MCNC: 14.7 G/DL (ref 14–18)
IMM GRANULOCYTES # BLD AUTO: 0.02 K/UL (ref 0–0.11)
IMM GRANULOCYTES NFR BLD AUTO: 0.3 % (ref 0–0.9)
LDLC SERPL CALC-MCNC: 110 MG/DL
LYMPHOCYTES # BLD AUTO: 1.96 K/UL (ref 1–4.8)
LYMPHOCYTES NFR BLD: 29.7 % (ref 22–41)
MCH RBC QN AUTO: 31.1 PG (ref 27–33)
MCHC RBC AUTO-ENTMCNC: 32.9 G/DL (ref 33.7–35.3)
MCV RBC AUTO: 94.7 FL (ref 81.4–97.8)
MONOCYTES # BLD AUTO: 0.32 K/UL (ref 0–0.85)
MONOCYTES NFR BLD AUTO: 4.9 % (ref 0–13.4)
NEUTROPHILS # BLD AUTO: 4.13 K/UL (ref 1.82–7.42)
NEUTROPHILS NFR BLD: 62.6 % (ref 44–72)
NRBC # BLD AUTO: 0 K/UL
NRBC BLD-RTO: 0 /100 WBC
PLATELET # BLD AUTO: 198 K/UL (ref 164–446)
PMV BLD AUTO: 9.9 FL (ref 9–12.9)
POTASSIUM SERPL-SCNC: 4.6 MMOL/L (ref 3.6–5.5)
PROT SERPL-MCNC: 7.1 G/DL (ref 6–8.2)
RBC # BLD AUTO: 4.72 M/UL (ref 4.7–6.1)
SODIUM SERPL-SCNC: 140 MMOL/L (ref 135–145)
TESTOST SERPL-MCNC: 644 NG/DL (ref 175–781)
TRIGL SERPL-MCNC: 57 MG/DL (ref 0–149)
WBC # BLD AUTO: 6.6 K/UL (ref 4.8–10.8)

## 2021-08-23 PROCEDURE — 36415 COLL VENOUS BLD VENIPUNCTURE: CPT

## 2021-08-23 PROCEDURE — 84403 ASSAY OF TOTAL TESTOSTERONE: CPT

## 2021-08-23 PROCEDURE — 82306 VITAMIN D 25 HYDROXY: CPT

## 2021-08-23 PROCEDURE — 85025 COMPLETE CBC W/AUTO DIFF WBC: CPT

## 2021-08-23 PROCEDURE — 80053 COMPREHEN METABOLIC PANEL: CPT

## 2021-08-23 PROCEDURE — 80061 LIPID PANEL: CPT

## 2021-10-07 RX ORDER — ALBUTEROL SULFATE 90 UG/1
2 AEROSOL, METERED RESPIRATORY (INHALATION) EVERY 4 HOURS PRN
Qty: 6.7 EACH | Refills: 1 | Status: SHIPPED | OUTPATIENT
Start: 2021-10-07 | End: 2022-09-09 | Stop reason: SDUPTHER

## 2021-10-07 NOTE — TELEPHONE ENCOUNTER
Requested Prescriptions     Signed Prescriptions Disp Refills   • albuterol 108 (90 Base) MCG/ACT Aero Soln inhalation aerosol 6.7 Each 1     Sig: INHALE 2 PUFFS EVERY FOUR HOURS AS NEEDED FOR SHORTNESS OF BREATH.     Authorizing Provider: PAULINA LAUREANO A.P.R.N.

## 2021-11-10 ENCOUNTER — APPOINTMENT (OUTPATIENT)
Dept: SLEEP MEDICINE | Facility: MEDICAL CENTER | Age: 51
End: 2021-11-10
Payer: COMMERCIAL

## 2022-03-03 ENCOUNTER — APPOINTMENT (OUTPATIENT)
Dept: MEDICAL GROUP | Facility: PHYSICIAN GROUP | Age: 52
End: 2022-03-03
Payer: COMMERCIAL

## 2022-08-27 SDOH — ECONOMIC STABILITY: HOUSING INSECURITY
IN THE LAST 12 MONTHS, WAS THERE A TIME WHEN YOU DID NOT HAVE A STEADY PLACE TO SLEEP OR SLEPT IN A SHELTER (INCLUDING NOW)?: NO

## 2022-08-27 SDOH — ECONOMIC STABILITY: FOOD INSECURITY: WITHIN THE PAST 12 MONTHS, THE FOOD YOU BOUGHT JUST DIDN'T LAST AND YOU DIDN'T HAVE MONEY TO GET MORE.: PATIENT DECLINED

## 2022-08-27 SDOH — ECONOMIC STABILITY: TRANSPORTATION INSECURITY

## 2022-08-27 SDOH — HEALTH STABILITY: PHYSICAL HEALTH

## 2022-08-27 SDOH — HEALTH STABILITY: MENTAL HEALTH
STRESS IS WHEN SOMEONE FEELS TENSE, NERVOUS, ANXIOUS, OR CAN'T SLEEP AT NIGHT BECAUSE THEIR MIND IS TROUBLED. HOW STRESSED ARE YOU?: TO SOME EXTENT

## 2022-08-27 SDOH — ECONOMIC STABILITY: INCOME INSECURITY: IN THE LAST 12 MONTHS, WAS THERE A TIME WHEN YOU WERE NOT ABLE TO PAY THE MORTGAGE OR RENT ON TIME?: NO

## 2022-08-27 SDOH — ECONOMIC STABILITY: INCOME INSECURITY: HOW HARD IS IT FOR YOU TO PAY FOR THE VERY BASICS LIKE FOOD, HOUSING, MEDICAL CARE, AND HEATING?: PATIENT DECLINED

## 2022-08-27 SDOH — ECONOMIC STABILITY: HOUSING INSECURITY

## 2022-08-27 SDOH — ECONOMIC STABILITY: FOOD INSECURITY: WITHIN THE PAST 12 MONTHS, YOU WORRIED THAT YOUR FOOD WOULD RUN OUT BEFORE YOU GOT MONEY TO BUY MORE.: PATIENT DECLINED

## 2022-08-27 ASSESSMENT — SOCIAL DETERMINANTS OF HEALTH (SDOH)
HOW MANY DRINKS CONTAINING ALCOHOL DO YOU HAVE ON A TYPICAL DAY WHEN YOU ARE DRINKING: PATIENT DOES NOT DRINK
DO YOU BELONG TO ANY CLUBS OR ORGANIZATIONS SUCH AS CHURCH GROUPS UNIONS, FRATERNAL OR ATHLETIC GROUPS, OR SCHOOL GROUPS?: NO
WITHIN THE PAST 12 MONTHS, YOU WORRIED THAT YOUR FOOD WOULD RUN OUT BEFORE YOU GOT THE MONEY TO BUY MORE: PATIENT DECLINED
DO YOU BELONG TO ANY CLUBS OR ORGANIZATIONS SUCH AS CHURCH GROUPS UNIONS, FRATERNAL OR ATHLETIC GROUPS, OR SCHOOL GROUPS?: NO
HOW HARD IS IT FOR YOU TO PAY FOR THE VERY BASICS LIKE FOOD, HOUSING, MEDICAL CARE, AND HEATING?: PATIENT DECLINED
HOW OFTEN DO YOU HAVE A DRINK CONTAINING ALCOHOL: NEVER
HOW OFTEN DO YOU HAVE SIX OR MORE DRINKS ON ONE OCCASION: NEVER

## 2022-08-27 ASSESSMENT — LIFESTYLE VARIABLES
HOW OFTEN DO YOU HAVE A DRINK CONTAINING ALCOHOL: NEVER
AUDIT-C TOTAL SCORE: 0
HOW OFTEN DO YOU HAVE SIX OR MORE DRINKS ON ONE OCCASION: NEVER
SKIP TO QUESTIONS 9-10: 1
HOW MANY STANDARD DRINKS CONTAINING ALCOHOL DO YOU HAVE ON A TYPICAL DAY: PATIENT DOES NOT DRINK

## 2022-08-30 ENCOUNTER — OFFICE VISIT (OUTPATIENT)
Dept: MEDICAL GROUP | Facility: PHYSICIAN GROUP | Age: 52
End: 2022-08-30
Payer: COMMERCIAL

## 2022-08-30 VITALS
WEIGHT: 203 LBS | SYSTOLIC BLOOD PRESSURE: 134 MMHG | HEART RATE: 69 BPM | DIASTOLIC BLOOD PRESSURE: 80 MMHG | TEMPERATURE: 98 F | OXYGEN SATURATION: 97 % | HEIGHT: 74 IN | BODY MASS INDEX: 26.05 KG/M2

## 2022-08-30 DIAGNOSIS — Z13.220 SCREENING FOR LIPID DISORDERS: ICD-10-CM

## 2022-08-30 DIAGNOSIS — L20.82 FLEXURAL ECZEMA: ICD-10-CM

## 2022-08-30 DIAGNOSIS — Z13.228 SCREENING FOR ENDOCRINE, METABOLIC AND IMMUNITY DISORDER: ICD-10-CM

## 2022-08-30 DIAGNOSIS — Z72.0 TOBACCO USE: ICD-10-CM

## 2022-08-30 DIAGNOSIS — R06.83 SNORING: ICD-10-CM

## 2022-08-30 DIAGNOSIS — Z12.11 SCREENING FOR COLORECTAL CANCER: ICD-10-CM

## 2022-08-30 DIAGNOSIS — I10 PRIMARY HYPERTENSION: ICD-10-CM

## 2022-08-30 DIAGNOSIS — Z12.12 SCREENING FOR COLORECTAL CANCER: ICD-10-CM

## 2022-08-30 DIAGNOSIS — Z86.19 HISTORY OF HEPATITIS C: ICD-10-CM

## 2022-08-30 DIAGNOSIS — Z13.29 SCREENING FOR ENDOCRINE, METABOLIC AND IMMUNITY DISORDER: ICD-10-CM

## 2022-08-30 DIAGNOSIS — Z76.89 ENCOUNTER TO ESTABLISH CARE: ICD-10-CM

## 2022-08-30 DIAGNOSIS — Z13.0 SCREENING FOR ENDOCRINE, METABOLIC AND IMMUNITY DISORDER: ICD-10-CM

## 2022-08-30 PROBLEM — F41.9 ANXIETY: Status: RESOLVED | Noted: 2019-12-23 | Resolved: 2022-08-30

## 2022-08-30 PROCEDURE — 99214 OFFICE O/P EST MOD 30 MIN: CPT | Performed by: NURSE PRACTITIONER

## 2022-08-30 RX ORDER — LOSARTAN POTASSIUM 100 MG/1
100 TABLET ORAL
Qty: 90 TABLET | Refills: 3 | Status: SHIPPED | OUTPATIENT
Start: 2022-08-30 | End: 2023-08-14

## 2022-08-30 ASSESSMENT — PATIENT HEALTH QUESTIONNAIRE - PHQ9: CLINICAL INTERPRETATION OF PHQ2 SCORE: 0

## 2022-08-30 ASSESSMENT — FIBROSIS 4 INDEX: FIB4 SCORE: 1.04

## 2022-08-30 NOTE — PROGRESS NOTES
Subjective:     CC:  Diagnoses of Encounter to establish care, Primary hypertension, History of hepatitis C, Tobacco use, Flexural eczema, Snoring, Screening for endocrine, metabolic and immunity disorder, Screening for lipid disorders, Essential hypertension, and Screening for colorectal cancer were pertinent to this visit.    HISTORY OF THE PRESENT ILLNESS: Patient is a 52 y.o. male. This pleasant patient is here today to establish care with his wife and discuss the following. His prior PCP was KAT Palacios.    Hypertension  His blood pressure today is at goal.  He notes for the last 4 weeks he has been cutting his losartan 100 mg tablet in half so he did not run out of medication. His last dose of Losartan was 2 days ago. He does not check his blood pressure at home. He does note headache and positional dizziness when standing when he misses several doses of his losartan. Denies chest pain, shortness of breath, or blurry vision. Did not tolerate HCTZ in the past due to increased urinary frequency.  He does need a medication refill today.    History of hepatitis C  Diagnosed in 2002 and completed treatment about 8 years ago. Due for updated labs.     Tobacco use  Currently smoking 1 pack a day that is down from previous 2 packs a day.  He is interested in quitting just not today.  He has not had any pulmonary function testing.  Does use albuterol as needed for wheezing.    Flexural eczema  Continues with intermittent flares. Using triamcinolone as needed. He does not moisturize.    Snoring  He continues to snore. He did not complete his sleep study due to COVID.  He is not interested in sleep study today.      Allergies: Patient has no known allergies.    Current Outpatient Medications Ordered in Epic   Medication Sig Dispense Refill    losartan (COZAAR) 100 MG Tab Take 1 Tablet by mouth every day. 90 Tablet 3    albuterol 108 (90 Base) MCG/ACT Aero Soln inhalation aerosol INHALE 2 PUFFS EVERY FOUR HOURS  "AS NEEDED FOR SHORTNESS OF BREATH. 6.7 Each 1    triamcinolone acetonide (KENALOG) 0.1 % Cream Apply 1 Application to affected area(s) 2 times a day. 60 g 6     No current Epic-ordered facility-administered medications on file.       Past Medical History:   Diagnosis Date    Hepatitis C     Hypertension     Tobacco use 5/10/2013       Past Surgical History:   Procedure Laterality Date    RECOVERY  4/13/2012    Performed by SURGERY, IR-RECOVERY at SURGERY SAME DAY [facility] ORS    LIVER BIOPSY         Social History     Tobacco Use    Smoking status: Every Day     Packs/day: 1.00     Years: 30.00     Pack years: 30.00     Types: Cigarettes    Smokeless tobacco: Never   Vaping Use    Vaping Use: Never used   Substance Use Topics    Alcohol use: No     Comment: sober x 27 years    Drug use: No     Comment: sober x 27 years       Social History     Social History Narrative    Not on file       Family History   Problem Relation Age of Onset    Other Mother 63        hepatits C, cirrhosis    Heart Attack Father 50    Hypertension Sister     Other Sister         MS    Cancer Sister         lymph node    Hypertension Brother     Cancer Maternal Grandmother     No Known Problems Paternal Grandmother     No Known Problems Paternal Grandfather     Hypertension Daughter     No Known Problems Daughter     No Known Problems Daughter     Diabetes Neg Hx        Health Maintenance: Reviewed.  Declines pneumonia vaccination today.  Cologuard ordered.        Objective:     Vital signs reviewed  Exam: /80 (BP Location: Left arm, Patient Position: Sitting, BP Cuff Size: Adult)   Pulse 69   Temp 36.7 °C (98 °F) (Temporal)   Ht 1.88 m (6' 2\")   Wt 92.1 kg (203 lb)   SpO2 97%  Body mass index is 26.06 kg/m².    Gen: Alert and oriented, No apparent distress. Wife present in exam room.   Neck: Neck is supple without lymphadenopathy.  Lungs: Normal effort, CTA bilaterally, no rhonchi, or rales.  Slight inspiratory wheezing that does " clear after coughing.  CV: Regular rate and rhythm. No murmurs, rubs, or gallops.  Ext: No clubbing, cyanosis, edema      Assessment & Plan:   52 y.o. male with the following -    1. Encounter to establish care  Acute uncomplicated problem.  Care established today.    2. Primary hypertension  Chronic exacerbated problem.  He has been out of medication for the last 2 days.  Restarting losartan 100 mg daily.  Checking updated labs.  He will return in 1 week for follow-up.  - losartan (COZAAR) 100 MG Tab; Take 1 Tablet by mouth every day.  Dispense: 90 Tablet; Refill: 3    3. Tobacco use  Chronic stable problem.  He is not interested in smoking cessation today.  He is interested in pulmonary function testing, order placed.  Continue to work on decreasing tobacco use.  - PULMONARY FUNCTION TESTS -Test requested: Complete Pulmonary Function Test; Future    4. Flexural eczema  Chronic stable problem.  Continue with triamcinolone as needed.  Encouraged daily moisturization especially after bath/showers.    5. Snoring  Chronic exacerbated problem.  Discussed referral back for sleep study he declines today.    6. History of hepatitis C  Chronic stable problem.  Checking updated labs.    7. Screening for endocrine, metabolic and immunity disorder  Chronic stable problem.  Due for updated labs.  He will return in 1 week for follow-up.  - CBC WITH DIFFERENTIAL; Future  - VITAMIN D,25 HYDROXY (DEFICIENCY); Future  - Comp Metabolic Panel; Future    8. Screening for lipid disorders  Chronic stable problem.  Due for updated labs.  - Lipid Profile; Future    9. Screening for colorectal cancer  Chronic stable problem.  He is interested in Cologuard.  Order placed today.  - COLOGUARD (FIT DNA)        Return in about 1 week (around 9/6/2022) for Labs, Hypertension.    Please note that this dictation was created using voice recognition software. I have made every reasonable attempt to correct obvious errors, but I expect that there are  errors of grammar and possibly content that I did not discover before finalizing the note.

## 2022-08-30 NOTE — ASSESSMENT & PLAN NOTE
Currently smoking 1 pack a day that is down from previous 2 packs a day.  He is interested in quitting just not today.  He has not had any pulmonary function testing.  Does use albuterol as needed for wheezing.

## 2022-08-30 NOTE — ASSESSMENT & PLAN NOTE
His blood pressure today is at goal.  He notes for the last 4 weeks he has been cutting his losartan 100 mg tablet in half so he did not run out of medication. His last dose of Losartan was 2 days ago. He does not check his blood pressure at home. He does note headache and positional dizziness when standing when he misses several doses of his losartan. Denies chest pain, shortness of breath, or blurry vision. Did not tolerate HCTZ in the past due to increased urinary frequency.  He does need a medication refill today.

## 2022-08-30 NOTE — ASSESSMENT & PLAN NOTE
He continues to snore. He did not complete his sleep study due to COVID.  He is not interested in sleep study today.

## 2022-08-31 ENCOUNTER — HOSPITAL ENCOUNTER (OUTPATIENT)
Dept: LAB | Facility: MEDICAL CENTER | Age: 52
End: 2022-08-31
Attending: NURSE PRACTITIONER
Payer: COMMERCIAL

## 2022-08-31 DIAGNOSIS — Z13.29 SCREENING FOR ENDOCRINE, METABOLIC AND IMMUNITY DISORDER: ICD-10-CM

## 2022-08-31 DIAGNOSIS — Z13.228 SCREENING FOR ENDOCRINE, METABOLIC AND IMMUNITY DISORDER: ICD-10-CM

## 2022-08-31 DIAGNOSIS — Z13.0 SCREENING FOR ENDOCRINE, METABOLIC AND IMMUNITY DISORDER: ICD-10-CM

## 2022-08-31 DIAGNOSIS — Z13.220 SCREENING FOR LIPID DISORDERS: ICD-10-CM

## 2022-08-31 LAB
25(OH)D3 SERPL-MCNC: 19 NG/ML (ref 30–100)
ALBUMIN SERPL BCP-MCNC: 4.5 G/DL (ref 3.2–4.9)
ALBUMIN/GLOB SERPL: 2 G/DL
ALP SERPL-CCNC: 82 U/L (ref 30–99)
ALT SERPL-CCNC: 12 U/L (ref 2–50)
ANION GAP SERPL CALC-SCNC: 10 MMOL/L (ref 7–16)
AST SERPL-CCNC: 17 U/L (ref 12–45)
BASOPHILS # BLD AUTO: 0.6 % (ref 0–1.8)
BASOPHILS # BLD: 0.04 K/UL (ref 0–0.12)
BILIRUB SERPL-MCNC: 0.3 MG/DL (ref 0.1–1.5)
BUN SERPL-MCNC: 15 MG/DL (ref 8–22)
CALCIUM SERPL-MCNC: 8.8 MG/DL (ref 8.5–10.5)
CHLORIDE SERPL-SCNC: 104 MMOL/L (ref 96–112)
CHOLEST SERPL-MCNC: 155 MG/DL (ref 100–199)
CO2 SERPL-SCNC: 24 MMOL/L (ref 20–33)
CREAT SERPL-MCNC: 0.91 MG/DL (ref 0.5–1.4)
EOSINOPHIL # BLD AUTO: 0.08 K/UL (ref 0–0.51)
EOSINOPHIL NFR BLD: 1.2 % (ref 0–6.9)
ERYTHROCYTE [DISTWIDTH] IN BLOOD BY AUTOMATED COUNT: 43.8 FL (ref 35.9–50)
FASTING STATUS PATIENT QL REPORTED: NORMAL
GFR SERPLBLD CREATININE-BSD FMLA CKD-EPI: 102 ML/MIN/1.73 M 2
GLOBULIN SER CALC-MCNC: 2.3 G/DL (ref 1.9–3.5)
GLUCOSE SERPL-MCNC: 94 MG/DL (ref 65–99)
HCT VFR BLD AUTO: 43.7 % (ref 42–52)
HDLC SERPL-MCNC: 57 MG/DL
HGB BLD-MCNC: 14.7 G/DL (ref 14–18)
IMM GRANULOCYTES # BLD AUTO: 0.02 K/UL (ref 0–0.11)
IMM GRANULOCYTES NFR BLD AUTO: 0.3 % (ref 0–0.9)
LDLC SERPL CALC-MCNC: 82 MG/DL
LYMPHOCYTES # BLD AUTO: 1.91 K/UL (ref 1–4.8)
LYMPHOCYTES NFR BLD: 28.4 % (ref 22–41)
MCH RBC QN AUTO: 31.6 PG (ref 27–33)
MCHC RBC AUTO-ENTMCNC: 33.6 G/DL (ref 33.7–35.3)
MCV RBC AUTO: 94 FL (ref 81.4–97.8)
MONOCYTES # BLD AUTO: 0.4 K/UL (ref 0–0.85)
MONOCYTES NFR BLD AUTO: 5.9 % (ref 0–13.4)
NEUTROPHILS # BLD AUTO: 4.28 K/UL (ref 1.82–7.42)
NEUTROPHILS NFR BLD: 63.6 % (ref 44–72)
NRBC # BLD AUTO: 0 K/UL
NRBC BLD-RTO: 0 /100 WBC
PLATELET # BLD AUTO: 171 K/UL (ref 164–446)
PMV BLD AUTO: 10.7 FL (ref 9–12.9)
POTASSIUM SERPL-SCNC: 4.4 MMOL/L (ref 3.6–5.5)
PROT SERPL-MCNC: 6.8 G/DL (ref 6–8.2)
RBC # BLD AUTO: 4.65 M/UL (ref 4.7–6.1)
SODIUM SERPL-SCNC: 138 MMOL/L (ref 135–145)
TRIGL SERPL-MCNC: 82 MG/DL (ref 0–149)
WBC # BLD AUTO: 6.7 K/UL (ref 4.8–10.8)

## 2022-08-31 PROCEDURE — 85025 COMPLETE CBC W/AUTO DIFF WBC: CPT

## 2022-08-31 PROCEDURE — 82306 VITAMIN D 25 HYDROXY: CPT

## 2022-08-31 PROCEDURE — 36415 COLL VENOUS BLD VENIPUNCTURE: CPT

## 2022-08-31 PROCEDURE — 80053 COMPREHEN METABOLIC PANEL: CPT

## 2022-08-31 PROCEDURE — 80061 LIPID PANEL: CPT

## 2022-09-09 ENCOUNTER — OFFICE VISIT (OUTPATIENT)
Dept: MEDICAL GROUP | Facility: PHYSICIAN GROUP | Age: 52
End: 2022-09-09
Payer: COMMERCIAL

## 2022-09-09 VITALS
HEART RATE: 81 BPM | OXYGEN SATURATION: 98 % | SYSTOLIC BLOOD PRESSURE: 114 MMHG | HEIGHT: 72 IN | TEMPERATURE: 99.3 F | BODY MASS INDEX: 27.5 KG/M2 | WEIGHT: 203 LBS | DIASTOLIC BLOOD PRESSURE: 78 MMHG

## 2022-09-09 DIAGNOSIS — L20.82 FLEXURAL ECZEMA: ICD-10-CM

## 2022-09-09 DIAGNOSIS — R06.2 WHEEZING: ICD-10-CM

## 2022-09-09 DIAGNOSIS — I10 PRIMARY HYPERTENSION: ICD-10-CM

## 2022-09-09 DIAGNOSIS — Z72.0 TOBACCO USE: ICD-10-CM

## 2022-09-09 DIAGNOSIS — E55.9 VITAMIN D DEFICIENCY: ICD-10-CM

## 2022-09-09 PROCEDURE — 99214 OFFICE O/P EST MOD 30 MIN: CPT | Performed by: NURSE PRACTITIONER

## 2022-09-09 RX ORDER — ERGOCALCIFEROL 1.25 MG/1
50000 CAPSULE ORAL
Qty: 12 CAPSULE | Refills: 0 | Status: SHIPPED | OUTPATIENT
Start: 2022-09-09 | End: 2023-10-24

## 2022-09-09 RX ORDER — TRIAMCINOLONE ACETONIDE 1 MG/G
1 CREAM TOPICAL 2 TIMES DAILY
Qty: 80 G | Refills: 3 | Status: SHIPPED | OUTPATIENT
Start: 2022-09-09 | End: 2023-03-28

## 2022-09-09 RX ORDER — ALBUTEROL SULFATE 90 UG/1
2 AEROSOL, METERED RESPIRATORY (INHALATION) EVERY 4 HOURS PRN
Qty: 6.7 EACH | Refills: 2 | Status: SHIPPED | OUTPATIENT
Start: 2022-09-09 | End: 2023-09-18

## 2022-09-09 ASSESSMENT — FIBROSIS 4 INDEX: FIB4 SCORE: 1.46

## 2022-09-09 NOTE — ASSESSMENT & PLAN NOTE
He does not take any supplementation.  Recent labs show vitamin D level of 19.  Wife states that she does have vitamin D supplementation at home 1000 unit capsules.

## 2022-09-09 NOTE — ASSESSMENT & PLAN NOTE
The headaches are improved.  His blood pressure is at goal today.  He continues with losartan 100 mg daily.  He had recent labs completed that he would like to review today. The 10-year ASCVD risk score (Edmarinder MYRICK Jr., et al., 2013) is: 4.9%

## 2022-09-09 NOTE — PROGRESS NOTES
Subjective:     CC: Lab results    HPI:   Milind presents today with his wife Emily for the following:    Hypertension  The headaches are improved.  His blood pressure is at goal today.  He continues with losartan 100 mg daily.  He had recent labs completed that he would like to review today. The 10-year ASCVD risk score (Edmar MYRICK Jr., et al., 2013) is: 4.9%      Flexural eczema  He continues with triamcinolone as needed for flares.  He would like a medication refill today.    Tobacco use  Continue smoking 1 pack a day.  Wife states that he is mention that he is not liking the taste anymore.  We discussed lung cancer screening today and he would like to hold off at this time.  We discussed immunizations today and he declines today.    Vitamin D deficiency  He does not take any supplementation.  Recent labs show vitamin D level of 19.  Wife states that she does have vitamin D supplementation at home 1000 unit capsules.        Past Medical History:   Diagnosis Date    Hepatitis C     Hypertension     Tobacco use 5/10/2013       Social History     Tobacco Use    Smoking status: Every Day     Packs/day: 1.00     Years: 30.00     Pack years: 30.00     Types: Cigarettes    Smokeless tobacco: Never   Vaping Use    Vaping Use: Some days    Substances: Nicotine, Flavoring, 25% or .25 % pt wasnt sure   Substance Use Topics    Alcohol use: No     Comment: sober x 27 years    Drug use: No     Comment: sober x 27 years       Current Outpatient Medications Ordered in Epic   Medication Sig Dispense Refill    ergocalciferol (DRISDOL) 45438 UNIT capsule Take 1 Capsule by mouth every 7 days. 12 Capsule 0    triamcinolone acetonide (KENALOG) 0.1 % Cream Apply 1 Application topically 2 times a day. 80 g 3    albuterol 108 (90 Base) MCG/ACT Aero Soln inhalation aerosol Inhale 2 Puffs every four hours as needed for Shortness of Breath. 6.7 Each 2    losartan (COZAAR) 100 MG Tab Take 1 Tablet by mouth every day. 90 Tablet 3     No  "current [facility]-ordered facility-administered medications on file.       Allergies:  Patient has no known allergies.    Health Maintenance: Reviewed.  Declines immunizations.  He will complete his Cologuard.      Objective:     Vital signs reviewed  Exam:  /78 (BP Location: Right arm, Patient Position: Sitting, BP Cuff Size: Adult)   Pulse 81   Temp 37.4 °C (99.3 °F) (Temporal)   Ht 1.84 m (6' 0.44\")   Wt 92.1 kg (203 lb)   SpO2 98%   BMI 27.20 kg/m²  Body mass index is 27.2 kg/m².    Gen: Alert and oriented, No apparent distress.  Wife present in exam room.  Lungs: Normal effort, wheezing and rhonchi throughout, states this does occur frequently for him   CV: Regular rate and rhythm. No murmurs, rubs, or gallops.  Ext: No clubbing, cyanosis, edema.        Assessment & Plan:     52 y.o. male with the following -     1. Primary hypertension  Chronic stable problem.  Discussed and reviewed his recent lab results.  He will continue with his losartan 100 mg daily.  Continue with diet and lifestyle modifications.  Reviewed ASCVD risk score.    2. Wheezing  Chronic exacerbated problem.  He is not having any shortness of breath today.  Discussed I would like for him to schedule his PFTs and he states that he will.  Continue butyryl as needed.  Await PFT results.  Follow-up after PFT results.  - albuterol 108 (90 Base) MCG/ACT Aero Soln inhalation aerosol; Inhale 2 Puffs every four hours as needed for Shortness of Breath.  Dispense: 6.7 Each; Refill: 2    3. Vitamin D deficiency  Chronic exacerbated problem.  Discussed and reviewed his recent lab results.  He will start ergocalciferol weekly treatment for 12 weeks.  After 12 weeks discussed he may start taking over-the-counter vitamin D3 1000 units daily.  He verbalizes understanding.  - ergocalciferol (DRISDOL) 39456 UNIT capsule; Take 1 Capsule by mouth every 7 days.  Dispense: 12 Capsule; Refill: 0    4. Tobacco use  Chronic stable problem.  Discussed lung " cancer screening today he declines.  Encouraged tobacco cessation.    5. Flexural eczema  Chronic stable problem.  He will continue his triamcinolone.  Larger tube dispensed today.  Medication refilled.  - triamcinolone acetonide (KENALOG) 0.1 % Cream; Apply 1 Application topically 2 times a day.  Dispense: 80 g; Refill: 3      Return if symptoms worsen or fail to improve.    Please note that this dictation was created using voice recognition software. I have made every reasonable attempt to correct obvious errors, but I expect that there are errors of grammar and possibly content that I did not discover before finalizing the note.

## 2022-09-09 NOTE — ASSESSMENT & PLAN NOTE
Continue smoking 1 pack a day.  Wife states that he is mention that he is not liking the taste anymore.  We discussed lung cancer screening today and he would like to hold off at this time.  We discussed immunizations today and he declines today.

## 2022-09-21 DIAGNOSIS — R19.5 POSITIVE COLORECTAL CANCER SCREENING USING COLOGUARD TEST: ICD-10-CM

## 2022-09-28 ENCOUNTER — HOSPITAL ENCOUNTER (OUTPATIENT)
Dept: PULMONOLOGY | Facility: MEDICAL CENTER | Age: 52
End: 2022-09-28
Attending: NURSE PRACTITIONER
Payer: COMMERCIAL

## 2022-09-28 DIAGNOSIS — Z72.0 TOBACCO USE: ICD-10-CM

## 2022-09-28 PROCEDURE — 94729 DIFFUSING CAPACITY: CPT | Mod: 26 | Performed by: INTERNAL MEDICINE

## 2022-09-28 PROCEDURE — 700101 HCHG RX REV CODE 250: Performed by: NURSE PRACTITIONER

## 2022-09-28 PROCEDURE — 94060 EVALUATION OF WHEEZING: CPT

## 2022-09-28 PROCEDURE — 94729 DIFFUSING CAPACITY: CPT

## 2022-09-28 PROCEDURE — 94726 PLETHYSMOGRAPHY LUNG VOLUMES: CPT | Mod: 26 | Performed by: INTERNAL MEDICINE

## 2022-09-28 PROCEDURE — 94726 PLETHYSMOGRAPHY LUNG VOLUMES: CPT

## 2022-09-28 PROCEDURE — 94060 EVALUATION OF WHEEZING: CPT | Mod: 26 | Performed by: INTERNAL MEDICINE

## 2022-09-28 RX ORDER — ALBUTEROL SULFATE 2.5 MG/3ML
2.5 SOLUTION RESPIRATORY (INHALATION)
Status: DISCONTINUED | OUTPATIENT
Start: 2022-09-28 | End: 2022-09-29 | Stop reason: HOSPADM

## 2022-09-28 RX ADMIN — ALBUTEROL SULFATE 2.5 MG: 2.5 SOLUTION RESPIRATORY (INHALATION) at 13:17

## 2022-09-28 ASSESSMENT — PULMONARY FUNCTION TESTS
FEV1/FVC: 76
FEV1/FVC: 75
FEV1/FVC_PERCENT_CHANGE: 133
FEV1/FVC_PERCENT_PREDICTED: 78
FEV1_PERCENT_PREDICTED: 85
FVC_PERCENT_PREDICTED: 87
FEV1_PERCENT_CHANGE: 4
FEV1/FVC_PREDICTED: 79
FEV1: 3.4
FEV1/FVC_PERCENT_PREDICTED: 95
FEV1_PERCENT_CHANGE: 3
FEV1/FVC_PERCENT_PREDICTED: 98
FEV1/FVC_PERCENT_PREDICTED: 97
FVC_PERCENT_PREDICTED: 84
FEV1_PREDICTED: 4.15
FVC: 4.5
FEV1/FVC_PERCENT_PREDICTED: 96
FEV1_PERCENT_PREDICTED: 81
FEV1_LLN: 3.47
FVC_PREDICTED: 5.31
FEV1/FVC_PERCENT_CHANGE: 1
FEV1/FVC: 76.39
FVC_LLN: 4.43
FEV1: 3.56
FEV1_LLN: 3.47
FEV1/FVC: 76
FEV1/FVC_PERCENT_LLN: 66
FEV1/FVC_PERCENT_LLN: 66
FVC_LLN: 4.43
FVC: 4.66

## 2022-10-01 NOTE — PROCEDURES
DATE OF SERVICE:  09/28/2022     PULMONARY FUNCTION TEST INTERPRETATION     REQUESTING PROVIDER:  KAT Kenney     REASON FOR REQUEST:  Tobacco use.     INTERPRETATION:    1.  Acceptable and reproducible.  2.  FEV1 3.4 liters (81%), FVC 4.5 liters (84%), ratio 75%.  3.  Flow volume loops consistent with obstruction and restriction.  4.  TLC 7.39 liters (100%).  5.  DLCO not done as the patient smoked 15 minutes prior to the test which   would affect the testing results.     IMPRESSION:  Spirometry did not meet criteria for obstruction; however, the   patient did have air trapping.  This would suggest some reactivity, especially   as there was bronchodilator response mid flow, but not in FEV1 and FVC.    Clinically correlate.        ______________________________  Azalea Coto MD    FM/SUB    DD:  10/01/2022 10:03  DT:  10/01/2022 10:49    Job#:  [id number]    CC:KAT Kenney

## 2022-11-03 ENCOUNTER — HOSPITAL ENCOUNTER (OUTPATIENT)
Dept: LAB | Facility: MEDICAL CENTER | Age: 52
End: 2022-11-03
Payer: COMMERCIAL

## 2022-11-03 LAB
ALBUMIN SERPL BCP-MCNC: 4.5 G/DL (ref 3.2–4.9)
ALBUMIN/GLOB SERPL: 1.7 G/DL
ALP SERPL-CCNC: 79 U/L (ref 30–99)
ALT SERPL-CCNC: 13 U/L (ref 2–50)
ANION GAP SERPL CALC-SCNC: 11 MMOL/L (ref 7–16)
AST SERPL-CCNC: 19 U/L (ref 12–45)
BASOPHILS # BLD AUTO: 0.7 % (ref 0–1.8)
BASOPHILS # BLD: 0.05 K/UL (ref 0–0.12)
BILIRUB SERPL-MCNC: 0.4 MG/DL (ref 0.1–1.5)
BUN SERPL-MCNC: 18 MG/DL (ref 8–22)
CALCIUM SERPL-MCNC: 8.9 MG/DL (ref 8.5–10.5)
CHLORIDE SERPL-SCNC: 103 MMOL/L (ref 96–112)
CO2 SERPL-SCNC: 25 MMOL/L (ref 20–33)
CREAT SERPL-MCNC: 0.8 MG/DL (ref 0.5–1.4)
EOSINOPHIL # BLD AUTO: 0.1 K/UL (ref 0–0.51)
EOSINOPHIL NFR BLD: 1.5 % (ref 0–6.9)
ERYTHROCYTE [DISTWIDTH] IN BLOOD BY AUTOMATED COUNT: 43.7 FL (ref 35.9–50)
GFR SERPLBLD CREATININE-BSD FMLA CKD-EPI: 107 ML/MIN/1.73 M 2
GLOBULIN SER CALC-MCNC: 2.7 G/DL (ref 1.9–3.5)
GLUCOSE SERPL-MCNC: 91 MG/DL (ref 65–99)
HCT VFR BLD AUTO: 43.3 % (ref 42–52)
HGB BLD-MCNC: 14.1 G/DL (ref 14–18)
IMM GRANULOCYTES # BLD AUTO: 0.02 K/UL (ref 0–0.11)
IMM GRANULOCYTES NFR BLD AUTO: 0.3 % (ref 0–0.9)
LYMPHOCYTES # BLD AUTO: 2.27 K/UL (ref 1–4.8)
LYMPHOCYTES NFR BLD: 33.6 % (ref 22–41)
MCH RBC QN AUTO: 30.6 PG (ref 27–33)
MCHC RBC AUTO-ENTMCNC: 32.6 G/DL (ref 33.7–35.3)
MCV RBC AUTO: 93.9 FL (ref 81.4–97.8)
MONOCYTES # BLD AUTO: 0.45 K/UL (ref 0–0.85)
MONOCYTES NFR BLD AUTO: 6.7 % (ref 0–13.4)
NEUTROPHILS # BLD AUTO: 3.87 K/UL (ref 1.82–7.42)
NEUTROPHILS NFR BLD: 57.2 % (ref 44–72)
NRBC # BLD AUTO: 0 K/UL
NRBC BLD-RTO: 0 /100 WBC
PLATELET # BLD AUTO: 214 K/UL (ref 164–446)
PMV BLD AUTO: 10.4 FL (ref 9–12.9)
POTASSIUM SERPL-SCNC: 4.3 MMOL/L (ref 3.6–5.5)
PROT SERPL-MCNC: 7.2 G/DL (ref 6–8.2)
RBC # BLD AUTO: 4.61 M/UL (ref 4.7–6.1)
SODIUM SERPL-SCNC: 139 MMOL/L (ref 135–145)
WBC # BLD AUTO: 6.8 K/UL (ref 4.8–10.8)

## 2022-11-03 PROCEDURE — 36415 COLL VENOUS BLD VENIPUNCTURE: CPT

## 2022-11-03 PROCEDURE — 80053 COMPREHEN METABOLIC PANEL: CPT

## 2022-11-03 PROCEDURE — 85025 COMPLETE CBC W/AUTO DIFF WBC: CPT

## 2022-11-26 DIAGNOSIS — E55.9 VITAMIN D DEFICIENCY: ICD-10-CM

## 2022-11-28 ENCOUNTER — HOSPITAL ENCOUNTER (OUTPATIENT)
Dept: RADIOLOGY | Facility: MEDICAL CENTER | Age: 52
End: 2022-11-28
Payer: COMMERCIAL

## 2022-11-28 DIAGNOSIS — Z86.19 HISTORY OF HEPATITIS C: ICD-10-CM

## 2022-11-28 PROCEDURE — 76700 US EXAM ABDOM COMPLETE: CPT

## 2022-11-28 RX ORDER — ERGOCALCIFEROL 1.25 MG/1
CAPSULE ORAL
Qty: 12 CAPSULE | Refills: 0 | OUTPATIENT
Start: 2022-11-28

## 2022-11-29 NOTE — TELEPHONE ENCOUNTER
Requested Prescriptions     Refused Prescriptions Disp Refills    vitamin D2, Ergocalciferol, (DRISDOL) 1.25 MG (94183 UT) Cap capsule [Pharmacy Med Name: VITAMIN D2 1.25MG(50,000 UNIT)] 12 Capsule 0     Sig: TAKE 1 CAPSULE BY MOUTH ONE TIME PER WEEK     Refused By: BECKY SHERWOOD     Reason for Refusal: Refill not appropriate.     Becky Sherwood A.P.R.N.

## 2023-01-04 DIAGNOSIS — E55.9 VITAMIN D DEFICIENCY: ICD-10-CM

## 2023-01-04 RX ORDER — ERGOCALCIFEROL 1.25 MG/1
CAPSULE ORAL
Qty: 12 CAPSULE | Refills: 0 | OUTPATIENT
Start: 2023-01-04

## 2023-01-04 NOTE — TELEPHONE ENCOUNTER
Requested Prescriptions     Refused Prescriptions Disp Refills    vitamin D2, Ergocalciferol, (DRISDOL) 1.25 MG (39120 UT) Cap capsule [Pharmacy Med Name: VITAMIN D2 1.25MG(50,000 UNIT)] 12 Capsule 0     Sig: TAKE 1 CAPSULE BY MOUTH ONE TIME PER WEEK     Refused By: BECKY SHERWOOD     Reason for Refusal: Refill not appropriate.     Becky Sherwood A.P.R.N.

## 2023-01-04 NOTE — TELEPHONE ENCOUNTER
Received request via: Pharmacy    Was the patient seen in the last year in this department? Yes    Does the patient have an active prescription (recently filled or refills available) for medication(s) requested? No    Does the patient have nursing home Plus and need 100 day supply (blood pressure, diabetes and cholesterol meds only)? Patient does not have SCP

## 2023-01-26 NOTE — TELEPHONE ENCOUNTER
Received request via: Patient    Was the patient seen in the last year in this department? Yes    Does the patient have an active prescription (recently filled or refills available) for medication(s) requested? He was told to double up on his 50 mg so he is out and needs this new RX of 100mg.   Statement Selected

## 2023-03-27 DIAGNOSIS — L20.82 FLEXURAL ECZEMA: ICD-10-CM

## 2023-03-28 RX ORDER — TRIAMCINOLONE ACETONIDE 1 MG/G
CREAM TOPICAL
Qty: 80 G | Refills: 0 | Status: SHIPPED | OUTPATIENT
Start: 2023-03-28 | End: 2023-07-19

## 2023-03-28 NOTE — TELEPHONE ENCOUNTER
Requested Prescriptions     Pending Prescriptions Disp Refills   • triamcinolone acetonide (KENALOG) 0.1 % Cream [Pharmacy Med Name: TRIAMCINOLONE 0.1% CREAM] 80 g 0     Sig: APPLY TO HANDS AND ARMS TWICE A DAY AS DIRECTED       OMAYRA Gaitan

## 2023-07-19 DIAGNOSIS — L20.82 FLEXURAL ECZEMA: ICD-10-CM

## 2023-07-19 RX ORDER — TRIAMCINOLONE ACETONIDE 1 MG/G
CREAM TOPICAL
Qty: 80 G | Refills: 1 | Status: SHIPPED | OUTPATIENT
Start: 2023-07-19 | End: 2023-10-24 | Stop reason: SDUPTHER

## 2023-07-20 NOTE — TELEPHONE ENCOUNTER
Requested Prescriptions     Signed Prescriptions Disp Refills    triamcinolone acetonide (KENALOG) 0.1 % Cream 80 g 1     Sig: APPLY TO HANDS AND ARMS TWICE A DAY AS DIRECTED     Authorizing Provider: TERRI SHERWOOD A.P.R.N.

## 2023-09-17 DIAGNOSIS — R06.2 WHEEZING: ICD-10-CM

## 2023-09-18 RX ORDER — ALBUTEROL SULFATE 90 UG/1
2 AEROSOL, METERED RESPIRATORY (INHALATION) EVERY 4 HOURS PRN
Qty: 6.7 EACH | Refills: 0 | Status: SHIPPED | OUTPATIENT
Start: 2023-09-18 | End: 2023-10-24 | Stop reason: SDUPTHER

## 2023-09-18 NOTE — TELEPHONE ENCOUNTER
Received request via: Pharmacy    Was the patient seen in the last year in this department? No LOV 9/9/23 sent [employer] message to make appt.     Does the patient have an active prescription (recently filled or refills available) for medication(s) requested? No    Does the patient have [facility] Plus and need 100 day supply (blood pressure, diabetes and cholesterol meds only)? Patient does not have SCP

## 2023-09-19 NOTE — TELEPHONE ENCOUNTER
Requested Prescriptions     Signed Prescriptions Disp Refills    albuterol 108 (90 Base) MCG/ACT Aero Soln inhalation aerosol 6.7 Each 0     Sig: INHALE 2 PUFFS EVERY FOUR HOURS AS NEEDED FOR SHORTNESS OF BREATH.     Authorizing Provider: TERRI SHERWOOD A.P.R.N.

## 2023-10-24 ENCOUNTER — OFFICE VISIT (OUTPATIENT)
Dept: MEDICAL GROUP | Facility: PHYSICIAN GROUP | Age: 53
End: 2023-10-24
Payer: COMMERCIAL

## 2023-10-24 ENCOUNTER — HOSPITAL ENCOUNTER (OUTPATIENT)
Dept: RADIOLOGY | Facility: MEDICAL CENTER | Age: 53
End: 2023-10-24
Attending: NURSE PRACTITIONER
Payer: COMMERCIAL

## 2023-10-24 ENCOUNTER — HOSPITAL ENCOUNTER (OUTPATIENT)
Dept: LAB | Facility: MEDICAL CENTER | Age: 53
End: 2023-10-24
Attending: NURSE PRACTITIONER
Payer: COMMERCIAL

## 2023-10-24 VITALS
SYSTOLIC BLOOD PRESSURE: 128 MMHG | HEART RATE: 97 BPM | HEIGHT: 74 IN | WEIGHT: 230 LBS | BODY MASS INDEX: 29.52 KG/M2 | TEMPERATURE: 99.6 F | OXYGEN SATURATION: 98 % | DIASTOLIC BLOOD PRESSURE: 74 MMHG

## 2023-10-24 DIAGNOSIS — R06.2 WHEEZING: ICD-10-CM

## 2023-10-24 DIAGNOSIS — L20.82 FLEXURAL ECZEMA: ICD-10-CM

## 2023-10-24 DIAGNOSIS — E55.9 VITAMIN D DEFICIENCY: ICD-10-CM

## 2023-10-24 DIAGNOSIS — I10 PRIMARY HYPERTENSION: ICD-10-CM

## 2023-10-24 DIAGNOSIS — M25.561 ACUTE PAIN OF RIGHT KNEE: ICD-10-CM

## 2023-10-24 LAB
25(OH)D3 SERPL-MCNC: 26 NG/ML (ref 30–100)
ALBUMIN SERPL BCP-MCNC: 5.3 G/DL (ref 3.2–4.9)
ALBUMIN/GLOB SERPL: 2.3 G/DL
ALP SERPL-CCNC: 82 U/L (ref 30–99)
ALT SERPL-CCNC: 18 U/L (ref 2–50)
ANION GAP SERPL CALC-SCNC: 11 MMOL/L (ref 7–16)
AST SERPL-CCNC: 21 U/L (ref 12–45)
BASOPHILS # BLD AUTO: 0.6 % (ref 0–1.8)
BASOPHILS # BLD: 0.04 K/UL (ref 0–0.12)
BILIRUB SERPL-MCNC: 0.5 MG/DL (ref 0.1–1.5)
BUN SERPL-MCNC: 13 MG/DL (ref 8–22)
CALCIUM ALBUM COR SERPL-MCNC: 8 MG/DL (ref 8.5–10.5)
CALCIUM SERPL-MCNC: 9 MG/DL (ref 8.5–10.5)
CHLORIDE SERPL-SCNC: 103 MMOL/L (ref 96–112)
CHOLEST SERPL-MCNC: 176 MG/DL (ref 100–199)
CO2 SERPL-SCNC: 25 MMOL/L (ref 20–33)
CREAT SERPL-MCNC: 0.93 MG/DL (ref 0.5–1.4)
EOSINOPHIL # BLD AUTO: 0.04 K/UL (ref 0–0.51)
EOSINOPHIL NFR BLD: 0.6 % (ref 0–6.9)
ERYTHROCYTE [DISTWIDTH] IN BLOOD BY AUTOMATED COUNT: 41.7 FL (ref 35.9–50)
FASTING STATUS PATIENT QL REPORTED: NORMAL
GFR SERPLBLD CREATININE-BSD FMLA CKD-EPI: 98 ML/MIN/1.73 M 2
GLOBULIN SER CALC-MCNC: 2.3 G/DL (ref 1.9–3.5)
GLUCOSE SERPL-MCNC: 93 MG/DL (ref 65–99)
HCT VFR BLD AUTO: 44.6 % (ref 42–52)
HDLC SERPL-MCNC: 65 MG/DL
HGB BLD-MCNC: 14.8 G/DL (ref 14–18)
IMM GRANULOCYTES # BLD AUTO: 0.02 K/UL (ref 0–0.11)
IMM GRANULOCYTES NFR BLD AUTO: 0.3 % (ref 0–0.9)
LDLC SERPL CALC-MCNC: 100 MG/DL
LYMPHOCYTES # BLD AUTO: 2.19 K/UL (ref 1–4.8)
LYMPHOCYTES NFR BLD: 33.2 % (ref 22–41)
MCH RBC QN AUTO: 30.8 PG (ref 27–33)
MCHC RBC AUTO-ENTMCNC: 33.2 G/DL (ref 32.3–36.5)
MCV RBC AUTO: 92.7 FL (ref 81.4–97.8)
MONOCYTES # BLD AUTO: 0.36 K/UL (ref 0–0.85)
MONOCYTES NFR BLD AUTO: 5.5 % (ref 0–13.4)
NEUTROPHILS # BLD AUTO: 3.95 K/UL (ref 1.82–7.42)
NEUTROPHILS NFR BLD: 59.8 % (ref 44–72)
NRBC # BLD AUTO: 0 K/UL
NRBC BLD-RTO: 0 /100 WBC (ref 0–0.2)
PLATELET # BLD AUTO: 207 K/UL (ref 164–446)
PMV BLD AUTO: 10.3 FL (ref 9–12.9)
POTASSIUM SERPL-SCNC: 4.5 MMOL/L (ref 3.6–5.5)
PROT SERPL-MCNC: 7.6 G/DL (ref 6–8.2)
RBC # BLD AUTO: 4.81 M/UL (ref 4.7–6.1)
SODIUM SERPL-SCNC: 139 MMOL/L (ref 135–145)
TRIGL SERPL-MCNC: 53 MG/DL (ref 0–149)
TSH SERPL DL<=0.005 MIU/L-ACNC: 0.93 UIU/ML (ref 0.38–5.33)
WBC # BLD AUTO: 6.6 K/UL (ref 4.8–10.8)

## 2023-10-24 PROCEDURE — 73562 X-RAY EXAM OF KNEE 3: CPT | Mod: RT

## 2023-10-24 PROCEDURE — 84443 ASSAY THYROID STIM HORMONE: CPT

## 2023-10-24 PROCEDURE — 85025 COMPLETE CBC W/AUTO DIFF WBC: CPT

## 2023-10-24 PROCEDURE — 82306 VITAMIN D 25 HYDROXY: CPT

## 2023-10-24 PROCEDURE — 36415 COLL VENOUS BLD VENIPUNCTURE: CPT

## 2023-10-24 PROCEDURE — 3074F SYST BP LT 130 MM HG: CPT | Performed by: NURSE PRACTITIONER

## 2023-10-24 PROCEDURE — 80061 LIPID PANEL: CPT

## 2023-10-24 PROCEDURE — 99214 OFFICE O/P EST MOD 30 MIN: CPT | Performed by: NURSE PRACTITIONER

## 2023-10-24 PROCEDURE — 80053 COMPREHEN METABOLIC PANEL: CPT

## 2023-10-24 PROCEDURE — 3078F DIAST BP <80 MM HG: CPT | Performed by: NURSE PRACTITIONER

## 2023-10-24 RX ORDER — TRIAMCINOLONE ACETONIDE 1 MG/G
CREAM TOPICAL
Qty: 80 G | Refills: 1 | Status: SHIPPED | OUTPATIENT
Start: 2023-10-24

## 2023-10-24 RX ORDER — ALBUTEROL SULFATE 90 UG/1
2 AEROSOL, METERED RESPIRATORY (INHALATION) EVERY 4 HOURS PRN
Qty: 6.7 EACH | Refills: 0 | Status: CANCELLED | OUTPATIENT
Start: 2023-10-24

## 2023-10-24 RX ORDER — ALBUTEROL SULFATE 90 UG/1
2 AEROSOL, METERED RESPIRATORY (INHALATION) EVERY 4 HOURS PRN
Qty: 6.7 EACH | Refills: 2 | Status: SHIPPED | OUTPATIENT
Start: 2023-10-24

## 2023-10-24 RX ORDER — LOSARTAN POTASSIUM 100 MG/1
100 TABLET ORAL
Qty: 90 TABLET | Refills: 3 | Status: SHIPPED | OUTPATIENT
Start: 2023-10-24

## 2023-10-24 ASSESSMENT — FIBROSIS 4 INDEX: FIB4 SCORE: 1.28

## 2023-10-24 NOTE — ASSESSMENT & PLAN NOTE
The right knee pain started 1 month ago. Has knee swelling. He has seen sports medicine before for the left knee. Aggravating factors include walking, kneeling. He is a . He has taken Aleve and topical medications that does help. Denies falls, trauma, bruising.

## 2023-10-24 NOTE — ASSESSMENT & PLAN NOTE
Blood pressure today 128/74.  Continues losartan 100 mg daily.  He does need a medication refill today.  Due for updated labs.  He is checking his blood pressure at home and gets readings 120-130/80.  Denies chest pain, shortness of breath or dizziness.

## 2023-10-24 NOTE — ASSESSMENT & PLAN NOTE
Completed ergocalciferol 50,000 course. Due for updated labs. He is taking OTC vitamin D3 supplements several days during the month.

## 2023-10-24 NOTE — ASSESSMENT & PLAN NOTE
Continues triamcinolone cream as needed for eczema flares.  He does need a medication refill today.

## 2023-10-24 NOTE — PROGRESS NOTES
Subjective:     CC: Medication refill, referral needed    HPI:   Milind presents today with his wife for the following:    Hypertension  Blood pressure today 128/74.  Continues losartan 100 mg daily.  He does need a medication refill today.  Due for updated labs.  He is checking his blood pressure at home and gets readings 120-130/80.  Denies chest pain, shortness of breath or dizziness.    Flexural eczema  Continues triamcinolone cream as needed for eczema flares.  He does need a medication refill today.    Wheezing  Continues albuterol inhaler as needed.  He is using the inhaler 2x a week at most. More at night. Continues to smoke and is down to 1 pack every 2 days. Last PFT 9/2022.    Vitamin D deficiency  Completed ergocalciferol 50,000 course. Due for updated labs. He is taking OTC vitamin D3 supplements several days during the month.    Acute pain of right knee  The right knee pain started 1 month ago. Has knee swelling. He has seen sports medicine before for the left knee. Aggravating factors include walking, kneeling. He is a . He has taken Aleve and topical medications that does help. Denies falls, trauma, bruising.     Past Medical History:   Diagnosis Date    Hepatitis C     Hypertension     Tobacco use 5/10/2013       Social History     Tobacco Use    Smoking status: Every Day     Current packs/day: 1.00     Average packs/day: 1 pack/day for 30.0 years (30.0 ttl pk-yrs)     Types: Cigarettes    Smokeless tobacco: Never    Tobacco comments:     1 pack every 2 days    Vaping Use    Vaping Use: Some days    Substances: Nicotine, Flavoring, 25% or .25 % pt wasnt sure   Substance Use Topics    Alcohol use: No     Comment: sober x 27 years    Drug use: No     Comment: sober x 27 years       Current Outpatient Medications Ordered in Epic   Medication Sig Dispense Refill    losartan (COZAAR) 100 MG Tab Take 1 Tablet by mouth every day. 90 Tablet 3    triamcinolone acetonide (KENALOG) 0.1 % Cream APPLY TO  "HANDS AND ARMS TWICE A DAY AS DIRECTED 80 g 1    albuterol 108 (90 Base) MCG/ACT Aero Soln inhalation aerosol Inhale 2 Puffs every four hours as needed for Shortness of Breath. 6.7 Each 2     No current Epic-ordered facility-administered medications on file.       Allergies:  Patient has no known allergies.    Health Maintenance: Reviewed      Objective:     Vital signs reviewed  Exam:  /74 (BP Location: Left arm, Patient Position: Sitting, BP Cuff Size: Adult)   Pulse 97   Temp 37.6 °C (99.6 °F) (Temporal)   Ht 1.88 m (6' 2\")   Wt 104 kg (230 lb)   SpO2 98%   BMI 29.53 kg/m²  Body mass index is 29.53 kg/m².    Gen: Alert and oriented, No apparent distress.  Lungs: Normal effort, CTA bilaterally, no wheezes, rhonchi, or rales  CV: Regular rate and rhythm. No murmurs, rubs, or gallops.  Knee: Right knee without obvious deformity. Tenderness at medial lateral line. No swelling.         Assessment & Plan:     53 y.o. male with the following -     1. Primary hypertension  Chronic stable problem.  Continue losartan 100 mg daily.  Due for updated labs.  He will return for his annual.  - losartan (COZAAR) 100 MG Tab; Take 1 Tablet by mouth every day.  Dispense: 90 Tablet; Refill: 3  - CBC WITH DIFFERENTIAL; Future  - Comp Metabolic Panel; Future  - Lipid Profile; Future  - TSH WITH REFLEX TO FT4; Future    2. Flexural eczema  Chronic stable problem.  Continue with triamcinolone topical cream as needed.  - triamcinolone acetonide (KENALOG) 0.1 % Cream; APPLY TO HANDS AND ARMS TWICE A DAY AS DIRECTED  Dispense: 80 g; Refill: 1    3. Wheezing  Chronic stable problem.  Continue albuterol inhaler as needed.  We will check updated PFTs.  - albuterol 108 (90 Base) MCG/ACT Aero Soln inhalation aerosol; Inhale 2 Puffs every four hours as needed for Shortness of Breath.  Dispense: 6.7 Each; Refill: 2  - PULMONARY FUNCTION TESTS -Test requested: Complete Pulmonary Function Test; Future    4. Vitamin D deficiency  Chronic " stable problem.  Due for updated labs.  - VITAMIN D,25 HYDROXY (DEFICIENCY); Future    5. Acute pain of right knee  Acute uncomplicated problem. Check imaging. Referral to sports medicine. Continue OTC analgesics as needed.   - Referral to Sports Medicine  - DX-KNEE 3 VIEWS Atraumatic Pain/Swelling/Deformity; Future      Return for annual, Labs.    Please note that this dictation was created using voice recognition software. I have made every reasonable attempt to correct obvious errors, but I expect that there are errors of grammar and possibly content that I did not discover before finalizing the note.

## 2023-10-24 NOTE — ASSESSMENT & PLAN NOTE
Continues albuterol inhaler as needed.  He is using the inhaler 2x a week at most. More at night. Continues to smoke and is down to 1 pack every 2 days. Last PFT 9/2022.

## 2023-10-26 ENCOUNTER — TELEPHONE (OUTPATIENT)
Dept: HEALTH INFORMATION MANAGEMENT | Facility: OTHER | Age: 53
End: 2023-10-26

## 2023-10-30 ENCOUNTER — TELEPHONE (OUTPATIENT)
Dept: HEALTH INFORMATION MANAGEMENT | Facility: OTHER | Age: 53
End: 2023-10-30

## 2023-10-31 ENCOUNTER — OFFICE VISIT (OUTPATIENT)
Dept: MEDICAL GROUP | Facility: PHYSICIAN GROUP | Age: 53
End: 2023-10-31
Payer: COMMERCIAL

## 2023-10-31 VITALS
DIASTOLIC BLOOD PRESSURE: 78 MMHG | OXYGEN SATURATION: 97 % | HEIGHT: 74 IN | SYSTOLIC BLOOD PRESSURE: 124 MMHG | BODY MASS INDEX: 29.52 KG/M2 | TEMPERATURE: 98.3 F | RESPIRATION RATE: 18 BRPM | WEIGHT: 230 LBS | HEART RATE: 109 BPM

## 2023-10-31 DIAGNOSIS — I10 PRIMARY HYPERTENSION: ICD-10-CM

## 2023-10-31 DIAGNOSIS — Z00.00 ANNUAL VISIT FOR GENERAL ADULT MEDICAL EXAMINATION WITHOUT ABNORMAL FINDINGS: ICD-10-CM

## 2023-10-31 DIAGNOSIS — E55.9 VITAMIN D DEFICIENCY: ICD-10-CM

## 2023-10-31 DIAGNOSIS — E78.00 ELEVATED LDL CHOLESTEROL LEVEL: ICD-10-CM

## 2023-10-31 DIAGNOSIS — Z72.0 TOBACCO USE: ICD-10-CM

## 2023-10-31 DIAGNOSIS — R19.5 POSITIVE COLORECTAL CANCER SCREENING USING COLOGUARD TEST: ICD-10-CM

## 2023-10-31 PROCEDURE — 3078F DIAST BP <80 MM HG: CPT | Performed by: NURSE PRACTITIONER

## 2023-10-31 PROCEDURE — 99396 PREV VISIT EST AGE 40-64: CPT | Performed by: NURSE PRACTITIONER

## 2023-10-31 PROCEDURE — 3074F SYST BP LT 130 MM HG: CPT | Performed by: NURSE PRACTITIONER

## 2023-10-31 ASSESSMENT — PATIENT HEALTH QUESTIONNAIRE - PHQ9: CLINICAL INTERPRETATION OF PHQ2 SCORE: 0

## 2023-10-31 ASSESSMENT — FIBROSIS 4 INDEX: FIB4 SCORE: 1.24

## 2023-10-31 NOTE — LETTER
[facility]  OMAYRA Kenney  [address] [place] [address]  Fax: [phone]   Authorization for Release/Disclosure of   Protected Health Information   Name: MILIND ARREGUIN : 1970 SSN: [id number]   Address: [address] [place] [address] Phone:    There are no phone numbers on file.   I authorize the entity listed below to release/disclose the PHI below to:   [facility]/OMAYRA Kenney and OMAYRA Kenney   Provider or Entity Name:  [facility]   Address   City, State, Zip   Phone:      Fax:     Reason for request: continuity of care   Information to be released:    [xxx ] LAST COLONOSCOPY,  including any PATH REPORT and follow-up  [  ] LAST FIT/COLOGUARD RESULT [  ] LAST DEXA  [  ] LAST MAMMOGRAM  [  ] LAST PAP  [  ] LAST LABS [  ] RETINA EXAM REPORT  [  ] IMMUNIZATION RECORDS  [  ] Release all info      [  ] Check here and initial the line next to each item to release ALL health information INCLUDING  _____ Care and treatment for drug and / or alcohol abuse  _____ HIV testing, infection status, or AIDS  _____ Genetic Testing    DATES OF SERVICE OR TIME PERIOD TO BE DISCLOSED: _____________  I understand and acknowledge that:  * This Authorization may be revoked at any time by you in writing, except if your health information has already been used or disclosed.  * Your health information that will be used or disclosed as a result of you signing this authorization could be re-disclosed by the recipient. If this occurs, your re-disclosed health information may no longer be protected by State or Federal laws.  * You may refuse to sign this Authorization. Your refusal will not affect your ability to obtain treatment.  * This Authorization becomes effective upon signing and will  on (date) __________.      If no date is indicated, this Authorization will  one (1) year from the signature date.    Name: Milindcaleb Yepez  Rather  Signature: Date:   10/31/2023     PLEASE FAX REQUESTED RECORDS BACK TO: [phone]

## 2023-10-31 NOTE — ASSESSMENT & PLAN NOTE
Blood pressure today 160/90 with pulse 109. On repeat blood pressure 124/78. Continues losartan 100 mg daily. He did not sleep well last night. Recent CMP unremarkable.

## 2023-10-31 NOTE — ASSESSMENT & PLAN NOTE
Continues to smoke 1 pack every 2 days and he is vaping.  He is not ready to quit but is working on decreasing his cigarette intake.

## 2023-10-31 NOTE — PROGRESS NOTES
Subjective:     CC:   Chief Complaint   Patient presents with    Annual Exam    Lab Follow-up       HPI:   Milind Mackey is a 53 y.o. male who presents for an annual exam. He is feeling well and has no complaints.    Hypertension  Blood pressure today 160/90 with pulse 109. On repeat blood pressure 124/78. Continues losartan 100 mg daily. He did not sleep well last night. Recent CMP unremarkable.       Elevated LDL cholesterol level  He has started exercising. Discussed to increase exercise and work on diet. Discussed ASCVD risk score. The 10-year ASCVD risk score (Iglesia MOLINA, et al., 2019) is: 9.8%      Vitamin D deficiency  Recently started daily multivitamin and vitamin D supplementation after recent labs showed vitamin D level of 26.    Tobacco use  Continues to smoke 1 pack every 2 days and he is vaping.  He is not ready to quit but is working on decreasing his cigarette intake.    Positive colorectal cancer screening using Cologuard test  He completed colonoscopy and we will request records.        Health Maintenance  PT/vit D for falls prevention: started daily vitamin D supplement after recent labs   Cholesterol Screening: 10/24/2023, discussed ASCVD risk score   Diabetes Screening: 10/24/2023   Diet: He is eating hamburgers, spaghetti, fajitas, sandwiches, fruits, vegetables, 1 soda a day, no energy drinks, 3 cups of coffee/day. He is drinking water in between.   Exercise: He recently started cardio, jumping jacks, walking and stretching.    Substance Abuse: Discussed and reviewed   Safe in relationship.   Seat belts safety discussed.  Sun protection use encouraged.    Cancer screening  Colorectal Cancer Screenin2022 +, had colonoscopy requesting records    Lung Cancer Screening: not in age group. Discussed screening criteria and recommend in the future at age 55.  Prostate Cancer Screening/PSA: n/a     Infectious disease screening/Immunizations  --STI Screening: no  --Practices safe  sex.  --Hepatitis C Screenin13   --Immunizations:    Influenza: declines    Tetanus: declines    Shingles: declines   Pneumococcal : declines     Other immunizations: declines COVID and Hepatitis B     He  has a past medical history of Hepatitis C, Hypertension, and Tobacco use (5/10/2013).    He has no past medical history of ASTHMA, Depression, Diabetes, or Hyperlipidemia.  He  has a past surgical history that includes recovery (2012) and liver biopsy.  Family History   Problem Relation Age of Onset    Other Mother 63        hepatits C, cirrhosis    Heart Attack Father 50    Hypertension Sister     Other Sister         MS    Cancer Sister         lymph node    Hypertension Brother     Cancer Maternal Grandmother     No Known Problems Paternal Grandmother     No Known Problems Paternal Grandfather     Hypertension Daughter     No Known Problems Daughter     No Known Problems Daughter     Diabetes Neg Hx      Social History     Tobacco Use    Smoking status: Every Day     Current packs/day: 1.00     Average packs/day: 1 pack/day for 30.0 years (30.0 ttl pk-yrs)     Types: Cigarettes    Smokeless tobacco: Never    Tobacco comments:     1 pack every 2 days    Vaping Use    Vaping Use: Some days    Substances: Nicotine, Flavoring, 25% or .25 % pt wasnt sure   Substance Use Topics    Alcohol use: No     Comment: sober x 27 years    Drug use: No     Comment: sober x 27 years       Patient Active Problem List    Diagnosis Date Noted    Elevated LDL cholesterol level 10/31/2023    Acute pain of right knee 10/24/2023    Positive colorectal cancer screening using Cologuard test 2022    Vitamin D deficiency 2022    Wheezing 2022    Snoring 2018    Flexural eczema 2018    Hypertension 05/10/2013    Tobacco use 05/10/2013    History of hepatitis C 05/10/2013       Current Outpatient Medications   Medication Sig Dispense Refill    multivitamin Tab Take 1 Tablet by mouth every day.       "VITAMIN D, CHOLECALCIFEROL, PO Take  by mouth every day.      losartan (COZAAR) 100 MG Tab Take 1 Tablet by mouth every day. 90 Tablet 3    triamcinolone acetonide (KENALOG) 0.1 % Cream APPLY TO HANDS AND ARMS TWICE A DAY AS DIRECTED 80 g 1    albuterol 108 (90 Base) MCG/ACT Aero Soln inhalation aerosol Inhale 2 Puffs every four hours as needed for Shortness of Breath. 6.7 Each 2     No current facility-administered medications for this visit.    (including changes today)  Allergies: Patient has no known allergies.    Review of Systems   Constitutional: Negative for fever, chills and malaise/fatigue.   HENT: Negative for congestion.    Eyes: Negative for pain.   Respiratory: Negative for cough and shortness of breath.    Cardiovascular: Negative for leg swelling.   Gastrointestinal: Negative for nausea, vomiting, abdominal pain and diarrhea.   Genitourinary: Negative for dysuria and hematuria.   Skin: Negative for rash.   Neurological: Negative for dizziness, focal weakness and headaches.   Endo/Heme/Allergies: Does not bruise/bleed easily.   Psychiatric/Behavioral: Negative for depression.  The patient is not nervous/anxious.      Objective:     Vital signs reviewed   /78 (BP Location: Right arm, Patient Position: Sitting)   Pulse (!) 109   Temp 36.8 °C (98.3 °F) (Temporal)   Resp 18   Ht 1.88 m (6' 2\")   Wt 104 kg (230 lb)   SpO2 97%   BMI 29.53 kg/m²   Body mass index is 29.53 kg/m².  Wt Readings from Last 4 Encounters:   10/31/23 104 kg (230 lb)   10/24/23 104 kg (230 lb)   09/09/22 92.1 kg (203 lb)   08/30/22 92.1 kg (203 lb)       Physical Exam:  Constitutional: Well-developed and well-nourished. Not diaphoretic. No distress.   Skin: Skin is warm and dry. No rash noted.  Head: Atraumatic without lesions.  Eyes: Conjunctivae and extraocular motions are normal. Pupils are equal, round, and reactive to light. No scleral icterus.   Ears:  External ears unremarkable. Tympanic membranes clear and " intact.  Nose: Nares patent. Septum midline. Turbinates without erythema nor edema. No discharge.   Mouth/Throat: Dentition is intact. Tongue normal. Oropharynx is clear and moist. Posterior pharynx without erythema or exudates.  Neck: Supple, trachea midline. Normal range of motion. No lymphadenopathy--cervical or supraclavicular.  Cardiovascular: Regular rate and rhythm, S1 and S2 without murmur, rubs, or gallops.    Lungs: Effort normal. Clear to auscultation throughout. No adventitious sounds. No CVA tenderness.  Abdomen: Soft, non tender, and without distention. Active bowel sounds in all four quadrants. No rebound, guarding, masses or HSM.  : Genitalia: deferred  Rectal: deferred  Prostate: deferred  Extremities: No cyanosis, clubbing, erythema, nor edema.   Musculoskeletal: All major joints AROM full in all directions without pain.  Neurological: Alert and oriented x 3.   Psychiatric:  Behavior, mood, and affect are appropriate.        Assessment and Plan:     Discussed and reviewed lab results from 10/24/2023.    1. Annual visit for general adult medical examination without abnormal findings  Acute uncomplicated problem.  Annual exam completed today. Discussion today about general wellness and lifestyle habits:  Engage in regular physical and social activities  Skin care, including sunscreen  Recommended annual eye exams and annual dental exams  Discussed wearing seatbelt when in car at all times    2. Primary hypertension  Chronic stable problem.  Repeat blood pressure is at goal of 124/78.  Continue losartan 100 mg daily.    3. Elevated LDL cholesterol level  Chronic exacerbated problem.  Discussed and reviewed his ASCVD risk.  He would like to avoid medication at this time.  Recommend continue to work on increasing exercise and diet.  Check annual labs.    4. Vitamin D deficiency  Chronic exacerbated problem.  Continue daily vitamin D supplementation.    5. Tobacco use  Chronic stable problem.   Encourage smoking cessation.  Discussed lung cancer screening.    6. Positive colorectal cancer screening using Cologuard test  Chronic stable problem.  We are requesting his colonoscopy records.  Per patient he states his colonoscopy was good.      HCM: declines vaccinations today.  Labs per orders.  Vaccinations per orders.  Counseling about diet, supplements, exercise, skin care and safe sex.    Follow-up: Return in about 6 months (around 4/30/2024) for Hypertension.    Please note that this dictation was created using voice recognition software. I have made every reasonable attempt to correct obvious errors, but I expect that there are errors of grammar and possibly content that I did not discover before finalizing the note.

## 2023-10-31 NOTE — ASSESSMENT & PLAN NOTE
He has started exercising. Discussed to increase exercise and work on diet. Discussed ASCVD risk score. The 10-year ASCVD risk score (Iglesia MOLINA, et al., 2019) is: 9.8%

## 2023-10-31 NOTE — ASSESSMENT & PLAN NOTE
Recently started daily multivitamin and vitamin D supplementation after recent labs showed vitamin D level of 26.

## 2023-12-15 ENCOUNTER — OFFICE VISIT (OUTPATIENT)
Dept: SPORTS MEDICINE | Facility: CLINIC | Age: 53
End: 2023-12-15
Payer: COMMERCIAL

## 2023-12-15 VITALS
RESPIRATION RATE: 18 BRPM | HEART RATE: 90 BPM | SYSTOLIC BLOOD PRESSURE: 136 MMHG | DIASTOLIC BLOOD PRESSURE: 90 MMHG | TEMPERATURE: 97.9 F | HEIGHT: 74 IN | BODY MASS INDEX: 29.52 KG/M2 | WEIGHT: 230 LBS | OXYGEN SATURATION: 96 %

## 2023-12-15 DIAGNOSIS — M17.11 PRIMARY OSTEOARTHRITIS OF ONE KNEE, RIGHT: ICD-10-CM

## 2023-12-15 PROCEDURE — 99214 OFFICE O/P EST MOD 30 MIN: CPT | Mod: 25 | Performed by: FAMILY MEDICINE

## 2023-12-15 PROCEDURE — 3075F SYST BP GE 130 - 139MM HG: CPT | Performed by: FAMILY MEDICINE

## 2023-12-15 PROCEDURE — 3080F DIAST BP >= 90 MM HG: CPT | Performed by: FAMILY MEDICINE

## 2023-12-15 PROCEDURE — 20610 DRAIN/INJ JOINT/BURSA W/O US: CPT | Mod: RT | Performed by: FAMILY MEDICINE

## 2023-12-15 RX ORDER — TRIAMCINOLONE ACETONIDE 40 MG/ML
40 INJECTION, SUSPENSION INTRA-ARTICULAR; INTRAMUSCULAR ONCE
Status: COMPLETED | OUTPATIENT
Start: 2023-12-15 | End: 2023-12-15

## 2023-12-15 RX ADMIN — TRIAMCINOLONE ACETONIDE 40 MG: 40 INJECTION, SUSPENSION INTRA-ARTICULAR; INTRAMUSCULAR at 14:06

## 2023-12-15 ASSESSMENT — FIBROSIS 4 INDEX: FIB4 SCORE: 1.24

## 2023-12-15 NOTE — PROCEDURES
PROCEDURE NOTE:  right Knee corticosteroid injection  Risks and benefits discussed  Informed consent obtained  Knee prepped in sterile fashion utilizing a aura- inferior approach  40 mg of Kenalog and 5 cc of bupivacaine injected into the knee joint space  Vapocoolant spray was utilized  Patient tolerated the procedure well  Postprocedure care and red flags discussed

## 2023-12-15 NOTE — PROGRESS NOTES
Chief Complaint   Patient presents with    Knee Pain     R knee pain      CHIEF COMPLAINT:  Milind Mackey male presenting at the request of OMAYRA Kenney  for evaluation of knee pain.     NEW PROBLEM  Milind Mackey is complaining of right knee pain  present for 3 months  Pain is at the medial  knee  Quality is pressure  Pain is non-radiating   Improved with resting  Aggravated by kneeling  no prior problems with this area in the past   Prior Treatments:  seen by PCP  Prior studies: X-Ray   Medications tried for pain include: naproxen (OTC) helps some  Mechanical Symptom history: No Locking, mostly stiffness    Light duty  work now     REVIEW OF SYSTEMS  No Nausea, No Vomiting, No Chest Pain, No Shortness of Breath, No Dizziness, No Headache      PAST MEDICAL HISTORY:   History reviewed. No pertinent past medical history.    PMH:  has a past medical history of Hepatitis C, Hypertension, and Tobacco use (5/10/2013).    He has no past medical history of ASTHMA, Depression, Diabetes, or Hyperlipidemia.  MEDS:   Current Outpatient Medications:     multivitamin Tab, Take 1 Tablet by mouth every day., Disp: , Rfl:     VITAMIN D, CHOLECALCIFEROL, PO, Take  by mouth every day., Disp: , Rfl:     losartan (COZAAR) 100 MG Tab, Take 1 Tablet by mouth every day., Disp: 90 Tablet, Rfl: 3    triamcinolone acetonide (KENALOG) 0.1 % Cream, APPLY TO HANDS AND ARMS TWICE A DAY AS DIRECTED, Disp: 80 g, Rfl: 1    albuterol 108 (90 Base) MCG/ACT Aero Soln inhalation aerosol, Inhale 2 Puffs every four hours as needed for Shortness of Breath., Disp: 6.7 Each, Rfl: 2    Current Facility-Administered Medications:     triamcinolone acetonide (Kenalog-40) injection 40 mg, 40 mg, Intra-articular, Once, Greg Medley M.D.  ALLERGIES: No Known Allergies  SURGHX:   Past Surgical History:   Procedure Laterality Date    RECOVERY  4/13/2012    Performed by SURGERY, IR-RECOVERY at SURGERY SAME DAY [facility]  "BIOPSY       SOCHX:  reports that he has been smoking cigarettes. He has a 30.0 pack-year smoking history. He has never used smokeless tobacco. He reports that he does not drink alcohol and does not use drugs.  FH: Family history was reviewed, no pertinent findings to report     PHYSICAL EXAM:  BP (!) 136/90 (BP Location: Left arm, Patient Position: Sitting, BP Cuff Size: Adult)   Pulse 90   Temp 36.6 °C (97.9 °F) (Temporal)   Resp 18   Ht 1.88 m (6' 2\")   Wt 104 kg (230 lb)   SpO2 96%   BMI 29.53 kg/m²      well-developed, well-nourished in no apparent distress, alert and oriented x 3.  Gait: Minimally antalgic     RIGHT Knee:  Slight Varus and No Swelling  Range of Motion Intact  Trace effusion  Patellar No tenderness and no apprehension  Medial Joint Line Tenderness and POSITIVE Oleg  Lateral Joint Line Non-tender and NEGATIVE Oleg  Trace Laxity with Varus stress  Trace Laxity with Valgus stress  Lachman's testing is Trace  Posterior Drawer Testing is Trace  The leg is otherwise neurovascularly intact    LEFT Knee:  Slight Varus and No Swelling   Range of Motion Intact  Trace effusion  Patellar No tenderness and no apprehension  Medial Joint Line Tenderness and NEGATIVE Oleg  Lateral Joint Line Non-tender and NEGATIVE Oleg  Trace Laxity with Varus stress  Trace Laxity with Valgus stress  Lachman's testing is Trace  Posterior Drawer Testing is Trace  The leg is otherwise neurovascularly intact    Additional Findings: None    1. Primary osteoarthritis of one knee, right  triamcinolone acetonide (Kenalog-40) injection 40 mg        NEW problem, RIGHT knee pain  present for 3 months  Pain is at the medial  knee  Quality is pressure    RIGHT knee x-rays demonstrate osteoarthritis of the RIGHT knee    Discussed knee osteoarthritis management options includin.  Joint protection  2.  Non-offending activities such as cycling or swimming   3.  Knee bracing  4. Injection options including " corticosteroid    Patient has elected to proceed with RIGHT knee intra-articular corticosteroid injection which was performed the office TODAY (December 15, 2023)    Return in about 4 weeks (around 1/12/2024).  To see how he is doing after RIGHT knee intra-articular corticosteroid injection        10/24/2023 9:58 AM     HISTORY/REASON FOR EXAM: Atraumatic Pain/Swelling/Deformity.     TECHNIQUE/EXAM DESCRIPTION AND NUMBER OF VIEWS: 3 views of the RIGHT knee.     COMPARISON: Contralateral knee 9/14/2019     FINDINGS:  No joint effusion is seen.     No displaced fracture is visualized.     There is no subluxation.     Preserved joint spaces.     Large quadriceps, moderate patellar enthesophyte formation. This is similar to the contralateral knee     IMPRESSION:     No radiographic evidence of osteoarthritis     Moderate enthesopathy           Exam Ended: 10/24/23 10:02 AM Last Resulted: 10/24/23 11:35 AM               Interpreted in the office today with the patient    Thank you OMAYRA Kenney for allowing me to participate in caring for your patient.

## 2023-12-15 NOTE — Clinical Note
Samuel Pena, Thank you for sending Milind back for his knee pain. We went ahead and injected his RIGHT knee at today's visit. Hope you have an amazing holiday season and no year! Respectfully,  VINAY Medley M.D. [facility] [phone]

## 2024-04-24 DIAGNOSIS — L20.82 FLEXURAL ECZEMA: ICD-10-CM

## 2024-04-24 RX ORDER — TRIAMCINOLONE ACETONIDE 1 MG/G
CREAM TOPICAL
Qty: 80 G | Refills: 1 | Status: SHIPPED | OUTPATIENT
Start: 2024-04-24

## 2024-04-24 NOTE — TELEPHONE ENCOUNTER
Received request via: Pharmacy    Was the patient seen in the last year in this department? Yes    Does the patient have an active prescription (recently filled or refills available) for medication(s) requested? No    [facility]    Does the patient have halfway Plus and need 100 day supply (blood pressure, diabetes and cholesterol meds only)? Patient does not have SCP

## 2024-10-25 DIAGNOSIS — L20.82 FLEXURAL ECZEMA: ICD-10-CM

## 2024-10-28 RX ORDER — TRIAMCINOLONE ACETONIDE 1 MG/G
CREAM TOPICAL
Qty: 80 G | Refills: 0 | Status: SHIPPED | OUTPATIENT
Start: 2024-10-28

## 2024-11-07 DIAGNOSIS — I10 PRIMARY HYPERTENSION: ICD-10-CM

## 2024-11-07 RX ORDER — LOSARTAN POTASSIUM 100 MG/1
100 TABLET ORAL
Qty: 90 TABLET | Refills: 0 | Status: SHIPPED | OUTPATIENT
Start: 2024-11-07

## 2024-11-07 NOTE — TELEPHONE ENCOUNTER
Requested Prescriptions     Signed Prescriptions Disp Refills    losartan (COZAAR) 100 MG Tab 90 Tablet 0     Sig: TAKE 1 TABLET BY MOUTH EVERY DAY     Authorizing Provider: TERRI SHERWOOD A.P.R.N.

## 2024-11-07 NOTE — TELEPHONE ENCOUNTER
Received request via: Pharmacy    Was the patient seen in the last year in this department? No sent [employer] message     Does the patient have an active prescription (recently filled or refills available) for medication(s) requested? No    [facility]    Does the patient have halfway Plus and need 100-day supply? (This applies to ALL medications) Patient does not have SCP

## 2025-02-05 DIAGNOSIS — I10 PRIMARY HYPERTENSION: ICD-10-CM

## 2025-02-05 RX ORDER — LOSARTAN POTASSIUM 100 MG/1
100 TABLET ORAL
Qty: 90 TABLET | Refills: 0 | Status: SHIPPED | OUTPATIENT
Start: 2025-02-05

## 2025-02-06 NOTE — TELEPHONE ENCOUNTER
Received request via: Pharmacy    Was the patient seen in the last year in this department? Yes    Does the patient have an active prescription (recently filled or refills available) for medication(s) requested? No    [facility]    Does the patient have prison Plus and need 100-day supply? (This applies to ALL medications) Patient does not have SCP

## 2025-05-05 DIAGNOSIS — I10 PRIMARY HYPERTENSION: ICD-10-CM

## 2025-05-05 RX ORDER — LOSARTAN POTASSIUM 100 MG/1
100 TABLET ORAL
Qty: 90 TABLET | Refills: 1 | Status: SHIPPED | OUTPATIENT
Start: 2025-05-05

## 2025-05-05 NOTE — TELEPHONE ENCOUNTER
Requested Prescriptions     Signed Prescriptions Disp Refills    losartan (COZAAR) 100 MG Tab 90 Tablet 1     Sig: TAKE 1 TABLET BY MOUTH EVERY DAY     Authorizing Provider: TERRI SHERWOOD A.P.R.N.

## 2025-05-05 NOTE — TELEPHONE ENCOUNTER
Received request via: Pharmacy    Was the patient seen in the last year in this department? No has upcoming appt 5/15/25    Does the patient have an active prescription (recently filled or refills available) for medication(s) requested? No    [facility]    Does the patient have long term Plus and need 100-day supply? (This applies to ALL medications) Patient does not have SCP

## 2025-05-13 SDOH — HEALTH STABILITY: PHYSICAL HEALTH: ON AVERAGE, HOW MANY MINUTES DO YOU ENGAGE IN EXERCISE AT THIS LEVEL?: 0 MIN

## 2025-05-13 SDOH — HEALTH STABILITY: MENTAL HEALTH
STRESS IS WHEN SOMEONE FEELS TENSE, NERVOUS, ANXIOUS, OR CAN'T SLEEP AT NIGHT BECAUSE THEIR MIND IS TROUBLED. HOW STRESSED ARE YOU?: RATHER MUCH

## 2025-05-13 SDOH — HEALTH STABILITY: PHYSICAL HEALTH: ON AVERAGE, HOW MANY DAYS PER WEEK DO YOU ENGAGE IN MODERATE TO STRENUOUS EXERCISE (LIKE A BRISK WALK)?: 2 DAYS

## 2025-05-13 SDOH — ECONOMIC STABILITY: TRANSPORTATION INSECURITY

## 2025-05-13 SDOH — ECONOMIC STABILITY: HOUSING INSECURITY

## 2025-05-13 ASSESSMENT — SOCIAL DETERMINANTS OF HEALTH (SDOH)
HOW MANY DRINKS CONTAINING ALCOHOL DO YOU HAVE ON A TYPICAL DAY WHEN YOU ARE DRINKING: PATIENT DOES NOT DRINK
HOW OFTEN DO YOU HAVE A DRINK CONTAINING ALCOHOL: NEVER
HOW OFTEN DO YOU HAVE SIX OR MORE DRINKS ON ONE OCCASION: NEVER

## 2025-05-13 ASSESSMENT — LIFESTYLE VARIABLES
HOW OFTEN DO YOU HAVE A DRINK CONTAINING ALCOHOL: NEVER
AUDIT-C TOTAL SCORE: 0
SKIP TO QUESTIONS 9-10: 1
HOW OFTEN DO YOU HAVE SIX OR MORE DRINKS ON ONE OCCASION: NEVER
HOW MANY STANDARD DRINKS CONTAINING ALCOHOL DO YOU HAVE ON A TYPICAL DAY: PATIENT DOES NOT DRINK

## 2025-05-15 ENCOUNTER — OFFICE VISIT (OUTPATIENT)
Dept: MEDICAL GROUP | Facility: PHYSICIAN GROUP | Age: 55
End: 2025-05-15
Payer: COMMERCIAL

## 2025-05-15 VITALS
WEIGHT: 242.51 LBS | RESPIRATION RATE: 14 BRPM | HEIGHT: 74 IN | BODY MASS INDEX: 31.12 KG/M2 | TEMPERATURE: 98.3 F | DIASTOLIC BLOOD PRESSURE: 89 MMHG | HEART RATE: 82 BPM | OXYGEN SATURATION: 98 % | SYSTOLIC BLOOD PRESSURE: 147 MMHG

## 2025-05-15 DIAGNOSIS — Z00.01 ANNUAL VISIT FOR GENERAL ADULT MEDICAL EXAMINATION WITH ABNORMAL FINDINGS: Primary | ICD-10-CM

## 2025-05-15 DIAGNOSIS — I10 PRIMARY HYPERTENSION: ICD-10-CM

## 2025-05-15 DIAGNOSIS — Z00.00 PREVENTATIVE HEALTH CARE: ICD-10-CM

## 2025-05-15 DIAGNOSIS — Z13.0 SCREENING FOR ENDOCRINE, METABOLIC AND IMMUNITY DISORDER: ICD-10-CM

## 2025-05-15 DIAGNOSIS — R06.83 SNORING: ICD-10-CM

## 2025-05-15 DIAGNOSIS — Z13.29 SCREENING FOR ENDOCRINE, METABOLIC AND IMMUNITY DISORDER: ICD-10-CM

## 2025-05-15 DIAGNOSIS — Z13.228 SCREENING FOR ENDOCRINE, METABOLIC AND IMMUNITY DISORDER: ICD-10-CM

## 2025-05-15 DIAGNOSIS — F41.1 GAD (GENERALIZED ANXIETY DISORDER): ICD-10-CM

## 2025-05-15 PROCEDURE — 3079F DIAST BP 80-89 MM HG: CPT | Performed by: NURSE PRACTITIONER

## 2025-05-15 PROCEDURE — 99214 OFFICE O/P EST MOD 30 MIN: CPT | Mod: 25 | Performed by: NURSE PRACTITIONER

## 2025-05-15 PROCEDURE — 99396 PREV VISIT EST AGE 40-64: CPT | Performed by: NURSE PRACTITIONER

## 2025-05-15 PROCEDURE — 3075F SYST BP GE 130 - 139MM HG: CPT | Performed by: NURSE PRACTITIONER

## 2025-05-15 RX ORDER — AMLODIPINE BESYLATE 5 MG/1
5 TABLET ORAL DAILY
Qty: 100 TABLET | Refills: 3 | Status: SHIPPED | OUTPATIENT
Start: 2025-05-15 | End: 2026-06-19

## 2025-05-15 RX ORDER — HYDROXYZINE HYDROCHLORIDE 25 MG/1
25 TABLET, FILM COATED ORAL 3 TIMES DAILY PRN
Qty: 30 TABLET | Refills: 2 | Status: SHIPPED | OUTPATIENT
Start: 2025-05-15

## 2025-05-15 ASSESSMENT — ANXIETY QUESTIONNAIRES
3. WORRYING TOO MUCH ABOUT DIFFERENT THINGS: MORE THAN HALF THE DAYS
4. TROUBLE RELAXING: NEARLY EVERY DAY
5. BEING SO RESTLESS THAT IT IS HARD TO SIT STILL: NEARLY EVERY DAY
1. FEELING NERVOUS, ANXIOUS, OR ON EDGE: MORE THAN HALF THE DAYS
GAD7 TOTAL SCORE: 16
6. BECOMING EASILY ANNOYED OR IRRITABLE: MORE THAN HALF THE DAYS
2. NOT BEING ABLE TO STOP OR CONTROL WORRYING: MORE THAN HALF THE DAYS
7. FEELING AFRAID AS IF SOMETHING AWFUL MIGHT HAPPEN: MORE THAN HALF THE DAYS

## 2025-05-15 ASSESSMENT — FIBROSIS 4 INDEX: FIB4 SCORE: 1.291238469992825914

## 2025-05-15 ASSESSMENT — PATIENT HEALTH QUESTIONNAIRE - PHQ9: CLINICAL INTERPRETATION OF PHQ2 SCORE: 0

## 2025-05-15 NOTE — PROGRESS NOTES
Subjective:     CC:   Chief Complaint   Patient presents with    Annual Exam       HPI:   Milind Mackey is a 55 y.o. male who presents for an annual exam. He is here today with his wife.      Hypertension  Repeat /88, home /89 with home cuff today and. Home BP today before meds  and after medication .  He has had more stress at work. Recent DOT physical with elevated BP.  He was given 3 months extension.  He this 3 months of NSAID 42 days. Continues losartan 100 mg daily.  Home BPs have been greater than 140/90.  Discussed adding additional medication and he is in agreement.  Starting amlodipine 5 mg daily, continue losartan 100 mg daily.    Snoring  He has been snoring. Would like sleep study. He sleeps about 4 hours and wakes up. He uses wife CPAP and sleeps different/feels different. Stopbang score today is 6.     YOVANNY (generalized anxiety disorder)  YOVANNY score today is 16.  He has been having increased anxiety.  We discussed starting medication and he is in agreement.  He like to start with as needed.  Start hydroxyzine 25 mg 3 times daily as needed.    Health Maintenance  Cholesterol Screening: Labs ordered   Diabetes Screening: Labs ordered   Diet: He is watching salt intake. No chips. He is eating almonds, fruits, vegetables, lean meats.  Encouraged healthy diet.  Exercise: He has been working on increasing exercise. Has more office job.  Encouraged 150 minutes weekly of moderate intensity exercise.  Substance Abuse: Discussed and reviewed   Seat belts safety discussed.  Sun protection used.    Cancer screening  Colorectal Cancer Screening: Colonoscopy 12/1/2022 with 10-year recall   Lung Cancer Screening: Current age 55   Prostate Cancer Screening/PSA: Current age 55     Infectious disease screening/Immunizations  --Practices safe sex.  --Immunizations:    Influenza: declines    Tetanus: declines    Shingles: declines   Pneumococcal : declines     Other immunizations: declines  COVID vaccine     He  has a past medical history of Hepatitis C, Hypertension, and Tobacco use (5/10/2013).    He has no past medical history of ASTHMA, Depression, Diabetes, or Hyperlipidemia.  He  has a past surgical history that includes recovery (4/13/2012) and liver biopsy.  Family History   Problem Relation Age of Onset    Other Mother 63        hepatits C, cirrhosis    Heart Attack Father 50    Hypertension Sister     Other Sister         MS    Cancer Sister         lymph node    Hypertension Brother     Cancer Maternal Grandmother     No Known Problems Paternal Grandmother     No Known Problems Paternal Grandfather     Hypertension Daughter     No Known Problems Daughter     No Known Problems Daughter     Diabetes Neg Hx      Social History[1]    Patient Active Problem List    Diagnosis Date Noted    YOVANNY (generalized anxiety disorder) 05/15/2025    Elevated LDL cholesterol level 10/31/2023    Acute pain of right knee 10/24/2023    Positive colorectal cancer screening using Cologuard test 09/21/2022    Vitamin D deficiency 09/09/2022    Wheezing 09/09/2022    Snoring 01/23/2018    Flexural eczema 01/23/2018    Hypertension 05/10/2013    Tobacco use 05/10/2013    History of hepatitis C 05/10/2013       Current Medications[2] (including changes today)  Allergies: Patient has no known allergies.    Review of Systems   Constitutional: Negative for fever, chills and malaise/fatigue.   HENT: Negative for congestion.    Eyes: Negative for pain.   Respiratory: Negative for cough and shortness of breath.  Positive for snoring.  Cardiovascular: Negative for leg swelling.   Gastrointestinal: Negative for nausea, vomiting, abdominal pain and diarrhea.   Genitourinary: Negative for dysuria and hematuria.   Skin: Negative for rash.   Neurological: Negative for dizziness, focal weakness and headaches.   Endo/Heme/Allergies: Does not bruise/bleed easily.   Psychiatric/Behavioral: Negative for depression.  Positive for  "anxiety.      Objective:     Vital signs reviewed   BP (!) 147/89 (BP Location: Right arm, Patient Position: Sitting, BP Cuff Size: Adult) Comment: home bp cuff  Pulse 82   Temp 36.8 °C (98.3 °F) (Temporal)   Resp 14   Ht 1.88 m (6' 2\")   Wt 110 kg (242 lb 8.1 oz)   SpO2 98%   BMI 31.14 kg/m²   Body mass index is 31.14 kg/m².  Wt Readings from Last 4 Encounters:   05/15/25 110 kg (242 lb 8.1 oz)   12/15/23 104 kg (230 lb)   10/31/23 104 kg (230 lb)   10/24/23 104 kg (230 lb)       Physical Exam:  Constitutional: Well-developed and well-nourished. Not diaphoretic. No distress.   Skin: Skin is warm and dry. No rash noted.  Head: Atraumatic without lesions.  Eyes: Conjunctivae and extraocular motions are normal. Pupils are equal, round, and reactive to light. No scleral icterus.   Ears:  External ears unremarkable. Tympanic membranes clear and intact.  Nose: Nares patent. Septum midline. Turbinates without erythema nor edema. No discharge.   Mouth/Throat: Dentition is with missing teeth. Tongue normal. Oropharynx is clear and moist. Posterior pharynx without erythema or exudates.  Neck: Supple, trachea midline. Normal range of motion. No lymphadenopathy--cervical or supraclavicular.  Cardiovascular: Regular rate and rhythm, S1 and S2 without murmur, rubs, or gallops.    Lungs: Effort normal. Clear to auscultation throughout. No adventitious sounds.   Abdomen: Soft, non tender, and without distention. Active bowel sounds in all four quadrants. No rebound, guarding, masses or HSM.  Extremities: No cyanosis, clubbing, erythema, nor edema.   Musculoskeletal: All major joints AROM full in all directions without pain.  Neurological: Alert and oriented x 3.   Psychiatric:  Behavior, mood, and affect are appropriate.      Assessment and Plan:     1. Annual visit for general adult medical examination with abnormal findings (Primary)  Acute uncomplicated problem.  Annual exam completed today. Discussion today about " general wellness and lifestyle habits:  Engage in regular physical and social activities  Skin care, including sunscreen  Recommended annual eye exams and annual dental exams  Discussed wearing seatbelt when in car at all times    2. Primary hypertension  Chronic exacerbated problem.  We discussed since visit exacerbated problem and there may be an additional charge.  He verbalized understanding.  Continue losartan 100 mg daily.  Start amlodipine 5 mg daily.  Continue home BP monitoring.  BP cuff correlated today in office and home BP cuff is approximately 10 points higher.  Updated labs ordered.  Follow-up in 3 weeks.  - amLODIPine (NORVASC) 5 MG Tab; Take 1 Tablet by mouth every day.  Dispense: 100 Tablet; Refill: 3    3. Snoring  Chronic exacerbated problem.  STOP-BANG score today is 6.  Home sleep study ordered.  - Overnight Home Sleep Study; Future    4. YOVANNY (generalized anxiety disorder)  Chronic exacerbated problem.  Start hydroxyzine 25 mg 3 times daily as needed.  If taking daily would recommend daily SSRI.  - hydrOXYzine HCl (ATARAX) 25 MG Tab; Take 1 Tablet by mouth 3 times a day as needed for Anxiety.  Dispense: 30 Tablet; Refill: 2    5. Preventative health care  Acute uncomplicated problem.  Annual labs ordered.  - CBC WITH DIFFERENTIAL; Future  - Comp Metabolic Panel; Future  - Lipid Profile; Future    6. Screening for endocrine, metabolic and immunity disorder  Acute uncomplicated problem.  Annual labs ordered.  - HEMOGLOBIN A1C; Future  - TSH WITH REFLEX TO FT4; Future  - VITAMIN D,25 HYDROXY (DEFICIENCY); Future      HCM: declines vaccines.  Labs per orders.  Vaccinations per orders.  Counseling about diet, supplements, exercise, skin care and safe sex.    Follow-up: Return in about 3 weeks (around 6/5/2025) for Hypertension, Labs, anxiety .     Educated in proper administration of medication(s) ordered today including safety, possible SE, risks, benefits, rationale and alternatives to therapy.      Please note that this dictation was created using voice recognition software. I have made every reasonable attempt to correct obvious errors, but I expect that there are errors of grammar and possibly content that I did not discover before finalizing the note.           [1]   Social History  Tobacco Use    Smoking status: Former     Current packs/day: 1.00     Average packs/day: 1 pack/day for 30.0 years (30.0 ttl pk-yrs)     Types: Cigarettes    Smokeless tobacco: Never    Tobacco comments:     1 pack every 2 days, quit cigarettes 1 month ago   Vaping Use    Vaping status: Some Days    Substances: Nicotine, Flavoring, 25% or .25 % pt wasnt sure   Substance Use Topics    Alcohol use: No     Comment: sober x 27 years    Drug use: No     Comment: sober x 27 years   [2]   Current Outpatient Medications   Medication Sig Dispense Refill    hydrOXYzine HCl (ATARAX) 25 MG Tab Take 1 Tablet by mouth 3 times a day as needed for Anxiety. 30 Tablet 2    amLODIPine (NORVASC) 5 MG Tab Take 1 Tablet by mouth every day. 100 Tablet 3    losartan (COZAAR) 100 MG Tab TAKE 1 TABLET BY MOUTH EVERY DAY 90 Tablet 1    triamcinolone acetonide (KENALOG) 0.1 % Cream APPLY TO HANDS AND ARMS TWICE A DAY AS DIRECTED (Patient not taking: Reported on 5/15/2025) 80 g 0    albuterol 108 (90 Base) MCG/ACT Aero Soln inhalation aerosol Inhale 2 Puffs every four hours as needed for Shortness of Breath. 6.7 Each 2    multivitamin Tab Take 1 Tablet by mouth every day. (Patient not taking: Reported on 5/15/2025)      VITAMIN D, CHOLECALCIFEROL, PO Take  by mouth every day. (Patient not taking: Reported on 5/15/2025)       No current facility-administered medications for this visit.

## 2025-05-16 ENCOUNTER — HOSPITAL ENCOUNTER (OUTPATIENT)
Dept: LAB | Facility: MEDICAL CENTER | Age: 55
End: 2025-05-16
Attending: NURSE PRACTITIONER
Payer: COMMERCIAL

## 2025-05-16 DIAGNOSIS — Z00.00 PREVENTATIVE HEALTH CARE: ICD-10-CM

## 2025-05-16 DIAGNOSIS — Z13.0 SCREENING FOR ENDOCRINE, METABOLIC AND IMMUNITY DISORDER: ICD-10-CM

## 2025-05-16 DIAGNOSIS — Z13.228 SCREENING FOR ENDOCRINE, METABOLIC AND IMMUNITY DISORDER: ICD-10-CM

## 2025-05-16 DIAGNOSIS — Z13.29 SCREENING FOR ENDOCRINE, METABOLIC AND IMMUNITY DISORDER: ICD-10-CM

## 2025-05-16 LAB
BASOPHILS # BLD AUTO: 0.7 % (ref 0–1.8)
BASOPHILS # BLD: 0.04 K/UL (ref 0–0.12)
EOSINOPHIL # BLD AUTO: 0.07 K/UL (ref 0–0.51)
EOSINOPHIL NFR BLD: 1.3 % (ref 0–6.9)
ERYTHROCYTE [DISTWIDTH] IN BLOOD BY AUTOMATED COUNT: 44 FL (ref 35.9–50)
EST. AVERAGE GLUCOSE BLD GHB EST-MCNC: 108 MG/DL
HBA1C MFR BLD: 5.4 % (ref 4–5.6)
HCT VFR BLD AUTO: 42.4 % (ref 42–52)
HGB BLD-MCNC: 13.9 G/DL (ref 14–18)
IMM GRANULOCYTES # BLD AUTO: 0.01 K/UL (ref 0–0.11)
IMM GRANULOCYTES NFR BLD AUTO: 0.2 % (ref 0–0.9)
LYMPHOCYTES # BLD AUTO: 0.68 K/UL (ref 1–4.8)
LYMPHOCYTES NFR BLD: 12.7 % (ref 22–41)
MCH RBC QN AUTO: 30.7 PG (ref 27–33)
MCHC RBC AUTO-ENTMCNC: 32.8 G/DL (ref 32.3–36.5)
MCV RBC AUTO: 93.6 FL (ref 81.4–97.8)
MONOCYTES # BLD AUTO: 0.38 K/UL (ref 0–0.85)
MONOCYTES NFR BLD AUTO: 7.1 % (ref 0–13.4)
NEUTROPHILS # BLD AUTO: 4.19 K/UL (ref 1.82–7.42)
NEUTROPHILS NFR BLD: 78 % (ref 44–72)
NRBC # BLD AUTO: 0 K/UL
NRBC BLD-RTO: 0 /100 WBC (ref 0–0.2)
PLATELET # BLD AUTO: 184 K/UL (ref 164–446)
PMV BLD AUTO: 10.2 FL (ref 9–12.9)
RBC # BLD AUTO: 4.53 M/UL (ref 4.7–6.1)
WBC # BLD AUTO: 5.4 K/UL (ref 4.8–10.8)

## 2025-05-16 PROCEDURE — 84443 ASSAY THYROID STIM HORMONE: CPT

## 2025-05-16 PROCEDURE — 85025 COMPLETE CBC W/AUTO DIFF WBC: CPT

## 2025-05-16 PROCEDURE — 80053 COMPREHEN METABOLIC PANEL: CPT

## 2025-05-16 PROCEDURE — 82306 VITAMIN D 25 HYDROXY: CPT

## 2025-05-16 PROCEDURE — 80061 LIPID PANEL: CPT

## 2025-05-16 PROCEDURE — 83036 HEMOGLOBIN GLYCOSYLATED A1C: CPT

## 2025-05-16 PROCEDURE — 36415 COLL VENOUS BLD VENIPUNCTURE: CPT

## 2025-05-16 NOTE — ASSESSMENT & PLAN NOTE
Repeat /88, home /89 with home cuff today and. Home BP today before meds  and after medication .  He has had more stress at work. Recent DOT physical with elevated BP.  He was given 3 months extension.  He this 3 months of NSAID 42 days. Continues losartan 100 mg daily.  Home BPs have been greater than 140/90.  Discussed adding additional medication and he is in agreement.  Starting amlodipine 5 mg daily, continue losartan 100 mg daily.

## 2025-05-16 NOTE — ASSESSMENT & PLAN NOTE
YOVANNY score today is 16.  He has been having increased anxiety.  We discussed starting medication and he is in agreement.  He like to start with as needed.  Start hydroxyzine 25 mg 3 times daily as needed.

## 2025-05-16 NOTE — ASSESSMENT & PLAN NOTE
He has been snoring. Would like sleep study. He sleeps about 4 hours and wakes up. He uses wife CPAP and sleeps different/feels different. Stopbang score today is 6.

## 2025-05-17 LAB
25(OH)D3 SERPL-MCNC: 19 NG/ML (ref 30–100)
ALBUMIN SERPL BCP-MCNC: 4.4 G/DL (ref 3.2–4.9)
ALBUMIN/GLOB SERPL: 1.7 G/DL
ALP SERPL-CCNC: 77 U/L (ref 30–99)
ALT SERPL-CCNC: 22 U/L (ref 2–50)
ANION GAP SERPL CALC-SCNC: 8 MMOL/L (ref 7–16)
AST SERPL-CCNC: 21 U/L (ref 12–45)
BILIRUB SERPL-MCNC: 0.3 MG/DL (ref 0.1–1.5)
BUN SERPL-MCNC: 21 MG/DL (ref 8–22)
CALCIUM ALBUM COR SERPL-MCNC: 8.6 MG/DL (ref 8.5–10.5)
CALCIUM SERPL-MCNC: 8.9 MG/DL (ref 8.5–10.5)
CHLORIDE SERPL-SCNC: 104 MMOL/L (ref 96–112)
CHOLEST SERPL-MCNC: 154 MG/DL (ref 100–199)
CO2 SERPL-SCNC: 25 MMOL/L (ref 20–33)
CREAT SERPL-MCNC: 1.12 MG/DL (ref 0.5–1.4)
FASTING STATUS PATIENT QL REPORTED: NORMAL
GFR SERPLBLD CREATININE-BSD FMLA CKD-EPI: 78 ML/MIN/1.73 M 2
GLOBULIN SER CALC-MCNC: 2.6 G/DL (ref 1.9–3.5)
GLUCOSE SERPL-MCNC: 79 MG/DL (ref 65–99)
HDLC SERPL-MCNC: 57 MG/DL
LDLC SERPL CALC-MCNC: 77 MG/DL
POTASSIUM SERPL-SCNC: 5 MMOL/L (ref 3.6–5.5)
PROT SERPL-MCNC: 7 G/DL (ref 6–8.2)
SODIUM SERPL-SCNC: 137 MMOL/L (ref 135–145)
TRIGL SERPL-MCNC: 98 MG/DL (ref 0–149)
TSH SERPL DL<=0.005 MIU/L-ACNC: 0.57 UIU/ML (ref 0.38–5.33)

## 2025-05-20 ENCOUNTER — RESULTS FOLLOW-UP (OUTPATIENT)
Dept: MEDICAL GROUP | Facility: PHYSICIAN GROUP | Age: 55
End: 2025-05-20

## 2025-05-20 DIAGNOSIS — G47.33 OBSTRUCTIVE SLEEP APNEA HYPOPNEA, SEVERE: Primary | ICD-10-CM

## 2025-05-20 NOTE — Clinical Note
REFERRAL APPROVAL NOTICE         Sent on May 20, 2025                   Milind Mackey  [address]  [place] [address]                   Dear Mr. Mackey,    After a careful review of the medical information and benefit coverage, [facility] has processed your referral. See below for additional details.    If applicable, you must be actively enrolled with your insurance for coverage of the authorized service. If you have any questions regarding your coverage, please contact your insurance directly.    REFERRAL INFORMATION   Referral #:  [id number]  Referred-To Department    Referred-By Provider:  Pulmonary and Sleep Medicine    OMAYRA Kenney   [facility]      [address]  [place] [address]  [phone] [address]  [place] [id number] [phone]    Referral Start Date:  05/15/2025  Referral End Date:   05/15/2026             SCHEDULING  If you do not already have an appointment, please call [phone] to make an appointment.     MORE INFORMATION  If you do not already have a [employer] account, sign up at: [email or web address]  You can access your medical information, make appointments, see lab results, billing information, and more.  If you have questions regarding this referral, please contact  the [facility] Referrals department at:             [phone]. Monday - Friday 8:00AM - 5:00PM.     Sincerely,    [facility]

## 2025-06-10 ENCOUNTER — OFFICE VISIT (OUTPATIENT)
Dept: MEDICAL GROUP | Facility: PHYSICIAN GROUP | Age: 55
End: 2025-06-10
Payer: COMMERCIAL

## 2025-06-10 VITALS
SYSTOLIC BLOOD PRESSURE: 128 MMHG | OXYGEN SATURATION: 97 % | HEART RATE: 90 BPM | HEIGHT: 74 IN | TEMPERATURE: 98.3 F | WEIGHT: 243 LBS | DIASTOLIC BLOOD PRESSURE: 78 MMHG | BODY MASS INDEX: 31.18 KG/M2

## 2025-06-10 DIAGNOSIS — D64.9 LOW HEMOGLOBIN: ICD-10-CM

## 2025-06-10 DIAGNOSIS — I10 PRIMARY HYPERTENSION: Primary | ICD-10-CM

## 2025-06-10 DIAGNOSIS — Z71.2 ENCOUNTER TO DISCUSS TEST RESULTS: ICD-10-CM

## 2025-06-10 DIAGNOSIS — E55.9 VITAMIN D DEFICIENCY: ICD-10-CM

## 2025-06-10 DIAGNOSIS — Z72.0 TOBACCO USE: ICD-10-CM

## 2025-06-10 DIAGNOSIS — E66.9 OBESITY (BMI 30-39.9): ICD-10-CM

## 2025-06-10 DIAGNOSIS — E78.00 ELEVATED LDL CHOLESTEROL LEVEL: ICD-10-CM

## 2025-06-10 DIAGNOSIS — R06.83 SNORING: ICD-10-CM

## 2025-06-10 DIAGNOSIS — F41.1 GAD (GENERALIZED ANXIETY DISORDER): ICD-10-CM

## 2025-06-10 DIAGNOSIS — R68.82 LOW LIBIDO: ICD-10-CM

## 2025-06-10 PROCEDURE — 99214 OFFICE O/P EST MOD 30 MIN: CPT | Performed by: NURSE PRACTITIONER

## 2025-06-10 PROCEDURE — 3078F DIAST BP <80 MM HG: CPT | Performed by: NURSE PRACTITIONER

## 2025-06-10 PROCEDURE — 3074F SYST BP LT 130 MM HG: CPT | Performed by: NURSE PRACTITIONER

## 2025-06-10 RX ORDER — ERGOCALCIFEROL 1.25 MG/1
50000 CAPSULE ORAL
Qty: 12 CAPSULE | Refills: 0 | Status: SHIPPED | OUTPATIENT
Start: 2025-06-10

## 2025-06-10 ASSESSMENT — FIBROSIS 4 INDEX: FIB4 SCORE: 1.31

## 2025-06-10 NOTE — PROGRESS NOTES
Subjective:     CC: Follow-up BP, labs, anxiety     HPI:   Milind presents today with the following:    Elevated LDL cholesterol level  Recent labs show LDL 77.  He has made diet changes. The 10-year ASCVD risk score (Iglesia MOLINA, et al., 2019) is: 3.9%.  Continue diet and exercise.      YOVANNY (generalized anxiety disorder)  For his anxiety he was started on hydroxyzine 25 mg 3 times daily as needed. He tried 1 dose and made his joint aches and made him sleepy.  Notes that he tried his daughter's hydroxyzine several years ago and had same issue.  He has been able to manage his anxiety better with deep breathing exercises now that his BP is controlled.  He would like to try quarter tablet to see if this helps his anxiety without causing him to be too sleepy.  We discussed daily medication and he would like to wait at this time.    Primary hypertension  BP today 128/78.  Amlodipine 5 mg daily was added to his medications at his last appointment.  Continues losartan 100 mg daily.  Home BP mostly < 140/90.     Snoring  Sleep study ordered with appointment 6/18/2025.    Vitamin D deficiency  Recent labs show vitamin D level of 19.  He started vitamin D 5000 units daily.  We discussed starting high-dose vitamin D for 12 weeks and he is in agreement.  He will stop over-the-counter vitamin D supplement and after completion of the high-dose vitamin D 12-week course he can resume his over-the-counter dose.    Tobacco use  He quit smoking cigarettes 1 month ago.  Continues to vape at this time.  He states that his lungs do feel better since quitting cigarettes.    Low libido  He has noticed decreased libido.  No erections issues at this time.  He would like to have his testosterone level checked.      Past Medical History[1]    Social History[2]    Current Medications and Prescriptions Ordered in Epic[3]    Allergies:  Patient has no known allergies.    Health Maintenance: Completed      Objective:     Vital signs  "reviewed  Exam:  /78 (BP Location: Right arm, Patient Position: Sitting, BP Cuff Size: Large adult)   Pulse 90   Temp 36.8 °C (98.3 °F) (Temporal)   Ht 1.88 m (6' 2\")   Wt 110 kg (243 lb)   SpO2 97%   BMI 31.20 kg/m²  Body mass index is 31.2 kg/m².    Gen: Alert and oriented, No apparent distress.  Lungs: Normal effort, CTA bilaterally, no wheezes, rhonchi, or rales  CV: Regular rate and rhythm. No murmurs, rubs, or gallops.  Ext: No clubbing, cyanosis, edema.      Assessment & Plan:     55 y.o. male with the following -     1. Primary hypertension (Primary)  Chronic stable problem.  Continue amlodipine 5 mg daily and losartan 100 mg daily.  Continue home BP monitoring.    2. Elevated LDL cholesterol level  Chronic stable problem.  Continue healthy diet and exercise.  ASCVD risk score is low at 3.9%.    3. YOVANNY (generalized anxiety disorder)  Chronic exacerbated problem.  He will continue with deep breathing exercises.  He will trial quarter dose of the hydroxyzine to see if this helps.  Overall feels better now that his BP is controlled.    4. Snoring  Chronic exacerbated problem.  Continue with home sleep study.    5. Low libido  Chronic exacerbated problem.  He would like to have his testosterone level checked.  We discussed to complete lab between 8 AM and 10 AM.  He verbalized understanding.  - Testosterone, Free & Total, Adult Male (w/SHBG); Future    6. Low hemoglobin  Acute uncomplicated problem.  New problem to examiner.  Recent labs show low hemoglobin.  He is not having any hematuria, bloody stools, bleeding gums or epistaxis.  Discussed to check labs for iron deficiency, folate deficiency or B12 deficiency.  - IRON/TOTAL IRON BIND; Future  - FERRITIN; Future  - VITAMIN B12; Future  - FOLATE; Future    7. Vitamin D deficiency  Chronic exacerbated problem.  Stop over-the-counter supplement.  Start ergocalciferol 50,000 units weekly x 12 weeks.  After completion may resume over-the-counter " vitamin D3 5000 units daily.  - ergocalciferol (DRISDOL) 09030 UNIT capsule; Take 1 Capsule by mouth every 7 days.  Dispense: 12 Capsule; Refill: 0    8. Tobacco use  Chronic exacerbated problem.  Continue with smoking cessation.  Would recommend no vaping but otherwise continue to work on cessation.    9. Encounter to discuss test results  Acute uncomplicated problem.  Discussed and reviewed lab results from 5/16/2025.    10. Obesity (BMI 30-39.9)  Chronic exacerbated problem.  Continue healthy diet and exercise.  - Patient identified as having weight management issue.  Appropriate orders and counseling given.      Return in about 4 months (around 10/10/2025) for Hypertension.    Please note that this dictation was created using voice recognition software. I have made every reasonable attempt to correct obvious errors, but I expect that there are errors of grammar and possibly content that I did not discover before finalizing the note.             [1]   Past Medical History:  Diagnosis Date    Hepatitis C     Hypertension     Tobacco use 5/10/2013   [2]   Social History  Tobacco Use    Smoking status: Former     Current packs/day: 1.00     Average packs/day: 1 pack/day for 30.0 years (30.0 ttl pk-yrs)     Types: Cigarettes    Smokeless tobacco: Never    Tobacco comments:     1 pack every 2 days, quit cigarettes 1 month ago (5/2025)   Vaping Use    Vaping status: Some Days    Substances: Nicotine, Flavoring, 25% or .25 % pt wasnt sure   Substance Use Topics    Alcohol use: No     Comment: sober x 27 years    Drug use: No     Comment: sober x 27 years   [3]   Current Outpatient Medications Ordered in Epic   Medication Sig Dispense Refill    ergocalciferol (DRISDOL) 31449 UNIT capsule Take 1 Capsule by mouth every 7 days. 12 Capsule 0    amLODIPine (NORVASC) 5 MG Tab Take 1 Tablet by mouth every day. 100 Tablet 3    losartan (COZAAR) 100 MG Tab TAKE 1 TABLET BY MOUTH EVERY DAY 90 Tablet 1    triamcinolone acetonide  (KENALOG) 0.1 % Cream APPLY TO HANDS AND ARMS TWICE A DAY AS DIRECTED (Patient not taking: Reported on 6/10/2025) 80 g 0    albuterol 108 (90 Base) MCG/ACT Aero Soln inhalation aerosol Inhale 2 Puffs every four hours as needed for Shortness of Breath. 6.7 Each 2    hydrOXYzine HCl (ATARAX) 25 MG Tab Take 1 Tablet by mouth 3 times a day as needed for Anxiety. (Patient not taking: Reported on 6/10/2025) 30 Tablet 2    multivitamin Tab Take 1 Tablet by mouth every day. (Patient not taking: Reported on 6/10/2025)       No current [facility]-ordered facility-administered medications on file.

## 2025-06-11 NOTE — ASSESSMENT & PLAN NOTE
He has noticed decreased libido.  No erections issues at this time.  He would like to have his testosterone level checked.

## 2025-06-11 NOTE — ASSESSMENT & PLAN NOTE
For his anxiety he was started on hydroxyzine 25 mg 3 times daily as needed. He tried 1 dose and made his joint aches and made him sleepy.  Notes that he tried his daughter's hydroxyzine several years ago and had same issue.  He has been able to manage his anxiety better with deep breathing exercises now that his BP is controlled.  He would like to try quarter tablet to see if this helps his anxiety without causing him to be too sleepy.  We discussed daily medication and he would like to wait at this time.

## 2025-06-11 NOTE — ASSESSMENT & PLAN NOTE
Recent labs show LDL 77.  He has made diet changes. The 10-year ASCVD risk score (Iglesia MOLINA, et al., 2019) is: 3.9%.  Continue diet and exercise.

## 2025-06-11 NOTE — ASSESSMENT & PLAN NOTE
BP today 128/78.  Amlodipine 5 mg daily was added to his medications at his last appointment.  Continues losartan 100 mg daily.  Home BP mostly < 140/90.

## 2025-06-11 NOTE — ASSESSMENT & PLAN NOTE
Recent labs show vitamin D level of 19.  He started vitamin D 5000 units daily.  We discussed starting high-dose vitamin D for 12 weeks and he is in agreement.  He will stop over-the-counter vitamin D supplement and after completion of the high-dose vitamin D 12-week course he can resume his over-the-counter dose.

## 2025-06-11 NOTE — ASSESSMENT & PLAN NOTE
He quit smoking cigarettes 1 month ago.  Continues to vape at this time.  He states that his lungs do feel better since quitting cigarettes.

## 2025-06-14 ENCOUNTER — HOSPITAL ENCOUNTER (OUTPATIENT)
Dept: LAB | Facility: MEDICAL CENTER | Age: 55
End: 2025-06-14
Attending: NURSE PRACTITIONER
Payer: COMMERCIAL

## 2025-06-14 DIAGNOSIS — D64.9 LOW HEMOGLOBIN: ICD-10-CM

## 2025-06-14 DIAGNOSIS — R68.82 LOW LIBIDO: ICD-10-CM

## 2025-06-14 LAB
FERRITIN SERPL-MCNC: 177 NG/ML (ref 22–322)
FOLATE SERPL-MCNC: 5.8 NG/ML
IRON SATN MFR SERPL: 49 % (ref 15–55)
IRON SERPL-MCNC: 139 UG/DL (ref 50–180)
TIBC SERPL-MCNC: 283 UG/DL (ref 250–450)
UIBC SERPL-MCNC: 144 UG/DL (ref 110–370)
VIT B12 SERPL-MCNC: 280 PG/ML (ref 211–911)

## 2025-06-14 PROCEDURE — 82746 ASSAY OF FOLIC ACID SERUM: CPT

## 2025-06-14 PROCEDURE — 84402 ASSAY OF FREE TESTOSTERONE: CPT

## 2025-06-14 PROCEDURE — 83540 ASSAY OF IRON: CPT

## 2025-06-14 PROCEDURE — 82728 ASSAY OF FERRITIN: CPT

## 2025-06-14 PROCEDURE — 83550 IRON BINDING TEST: CPT

## 2025-06-14 PROCEDURE — 82607 VITAMIN B-12: CPT

## 2025-06-14 PROCEDURE — 36415 COLL VENOUS BLD VENIPUNCTURE: CPT

## 2025-06-14 PROCEDURE — 84403 ASSAY OF TOTAL TESTOSTERONE: CPT

## 2025-06-14 PROCEDURE — 84270 ASSAY OF SEX HORMONE GLOBUL: CPT

## 2025-06-16 LAB
SHBG SERPL-SCNC: 42 NMOL/L (ref 19–76)
TESTOST FREE MFR SERPL: 1.6 % (ref 1.6–2.9)
TESTOST FREE SERPL-MCNC: 56 PG/ML (ref 47–244)
TESTOST SERPL-MCNC: 355 NG/DL (ref 300–890)

## 2025-06-18 ENCOUNTER — APPOINTMENT (OUTPATIENT)
Dept: SLEEP MEDICINE | Facility: MEDICAL CENTER | Age: 55
End: 2025-06-18
Attending: NURSE PRACTITIONER
Payer: COMMERCIAL

## 2025-06-18 ENCOUNTER — RESULTS FOLLOW-UP (OUTPATIENT)
Dept: MEDICAL GROUP | Facility: PHYSICIAN GROUP | Age: 55
End: 2025-06-18

## 2025-06-18 DIAGNOSIS — E55.9 VITAMIN D DEFICIENCY: ICD-10-CM

## 2025-06-18 DIAGNOSIS — D64.9 LOW HEMOGLOBIN: Primary | ICD-10-CM

## 2025-06-18 DIAGNOSIS — R06.83 SNORING: ICD-10-CM

## 2025-06-18 PROCEDURE — 95800 SLP STDY UNATTENDED: CPT | Mod: 26 | Performed by: STUDENT IN AN ORGANIZED HEALTH CARE EDUCATION/TRAINING PROGRAM

## 2025-07-10 ENCOUNTER — TELEPHONE (OUTPATIENT)
Dept: MEDICAL GROUP | Facility: PHYSICIAN GROUP | Age: 55
End: 2025-07-10
Payer: COMMERCIAL

## 2025-07-10 DIAGNOSIS — G47.33 OSA (OBSTRUCTIVE SLEEP APNEA): Primary | ICD-10-CM

## 2025-07-11 NOTE — PROCEDURES
DIAGNOSTIC HOME SLEEP TEST (HST) REPORT WatchPAT      PATIENT ID:  NAME:  Milind Mackey  MRN:               [id number]  YOB: 1970  DATE OF STUDY: 06/18/2025      Impression:     This study shows evidence of:      1. Severe obstructive sleep apnea with 3% PAT apnea hypopnea index(pAHI) of 59.6 per hour.  PAT respiratory disturbance index (pRDI) was 60.7 per hour. These findings are based on 7 channels recording of PAT signal with sleep staging, heart rate, pulse oximetry, actigraphy, body position, snoring and respiratory movement.     2. Oxygenation O2 Sat. mean O2 sat was 92%,  june was 82%,  and maximum O2 at 98 %. O2 sat was at or  below 88% for 9.2 min of evaluation time. Oxygen Desaturation (>=4%) Index was 36.5/hr. AVG HR was 85 BPM.      TECHNICAL DESCRIPTION: Patient underwent home sleep apnea testing with peripheral arterial tone signal (WatchPAT™). This is a Type IV portable monitor and device per Medicare. Monitoring was done with 7 channels recording of PAT signal with sleep staging, heart rate, pulse oximetry, actigraphy, body position, snoring and respiratory movement. Prior to using the device, the patient received verbal and written instructions for its application and was provided with the help desk phone number for additional telephonic instruction with 24-hour availability of qualified personnel to answer questions.    Respiratory events:          General sleep summary: . Total recording time is 7 hours and 58 minutes and total Sleep time is 6 hours and 19 minutes. The patient spent 347 minutes in the supine position and 33 minutes in the nonsupine position.      Recommendations:    1. CPAP titration study vs Auto CPAP trial.  If starting auto CPAP would recommend minimum pressure 5 cmH2O and maximum pressure 15 cmH2O.    2. In general patients with sleep apnea are advised to avoid alcohol and sedatives and to not operate a motor vehicle while drowsy. In some cases  alternative treatment options may prove effective in resolving sleep apnea in these options include upper airway surgery, the use of a dental orthotic or weight loss and positional therapy. Clinical correlation is required.

## 2025-07-14 NOTE — TELEPHONE ENCOUNTER
Message reviewed. Sleep study results have resulted showing severe DAISY, referral to sleep medicine.

## 2025-07-23 ENCOUNTER — OFFICE VISIT (OUTPATIENT)
Dept: MEDICAL GROUP | Facility: PHYSICIAN GROUP | Age: 55
End: 2025-07-23
Payer: COMMERCIAL

## 2025-07-23 VITALS
WEIGHT: 255 LBS | BODY MASS INDEX: 32.73 KG/M2 | SYSTOLIC BLOOD PRESSURE: 110 MMHG | HEIGHT: 74 IN | TEMPERATURE: 98.7 F | DIASTOLIC BLOOD PRESSURE: 70 MMHG | OXYGEN SATURATION: 98 % | HEART RATE: 96 BPM

## 2025-07-23 DIAGNOSIS — R53.83 OTHER FATIGUE: ICD-10-CM

## 2025-07-23 DIAGNOSIS — G47.33 OSA (OBSTRUCTIVE SLEEP APNEA): ICD-10-CM

## 2025-07-23 DIAGNOSIS — R68.82 LOW LIBIDO: ICD-10-CM

## 2025-07-23 DIAGNOSIS — Z72.0 TOBACCO USE: ICD-10-CM

## 2025-07-23 DIAGNOSIS — R06.2 WHEEZING: ICD-10-CM

## 2025-07-23 DIAGNOSIS — I10 PRIMARY HYPERTENSION: Primary | ICD-10-CM

## 2025-07-23 DIAGNOSIS — L20.82 FLEXURAL ECZEMA: ICD-10-CM

## 2025-07-23 DIAGNOSIS — E55.9 VITAMIN D DEFICIENCY: ICD-10-CM

## 2025-07-23 PROBLEM — R19.5 POSITIVE COLORECTAL CANCER SCREENING USING COLOGUARD TEST: Status: RESOLVED | Noted: 2022-09-21 | Resolved: 2025-07-23

## 2025-07-23 PROCEDURE — 99214 OFFICE O/P EST MOD 30 MIN: CPT | Performed by: NURSE PRACTITIONER

## 2025-07-23 PROCEDURE — 3074F SYST BP LT 130 MM HG: CPT | Performed by: NURSE PRACTITIONER

## 2025-07-23 PROCEDURE — 3078F DIAST BP <80 MM HG: CPT | Performed by: NURSE PRACTITIONER

## 2025-07-23 RX ORDER — TRIAMCINOLONE ACETONIDE 1 MG/G
CREAM TOPICAL
Qty: 80 G | Refills: 1 | Status: SHIPPED | OUTPATIENT
Start: 2025-07-23

## 2025-07-23 RX ORDER — ALBUTEROL SULFATE 90 UG/1
2 INHALANT RESPIRATORY (INHALATION) EVERY 4 HOURS PRN
Qty: 6.7 EACH | Refills: 2 | Status: SHIPPED | OUTPATIENT
Start: 2025-07-23

## 2025-07-23 ASSESSMENT — FIBROSIS 4 INDEX: FIB4 SCORE: 1.31

## 2025-07-23 NOTE — ASSESSMENT & PLAN NOTE
BP today 110/70.  Continues amlodipine 5 mg daily and losartan 100 mg daily.  Home /87 at [facility].  Denies chest pain, shortness of breath or dizziness.

## 2025-07-23 NOTE — ASSESSMENT & PLAN NOTE
Continues to have fatigue.  He is interested in testosterone replacement therapy.  His testosterone level was low normal but had decreased since his last reading in 2021.  His brother is on testosterone replacement therapy who has had improvement in his fatigue.  We discussed to repeat testosterone level first.  If low consider referral to urology.  We also discussed that with his recent diagnosis of DAISY, his untreated DAISY may also be contributing to his fatigue.

## 2025-07-23 NOTE — ASSESSMENT & PLAN NOTE
He completed home sleep study which did show DAISY.  He was referred to sleep medicine and has an upcoming appointment 7/30/2025.

## 2025-07-23 NOTE — PROGRESS NOTES
"Subjective:     CC: Hypertension follow-up, medication refill    HPI:   Milind presents today with the following:    Primary hypertension  BP today 110/70.  Continues amlodipine 5 mg daily and losartan 100 mg daily.  Home /87 at [facility].  Denies chest pain, shortness of breath or dizziness.    DAISY (obstructive sleep apnea)  He completed home sleep study which did show DAISY.  He was referred to sleep medicine and has an upcoming appointment 7/30/2025.    Flexural eczema  Continues to use triamcinolone as needed for his eczema flares.  He needs a medication refill today.    Tobacco use  Continues with smoking cessation since May 2025.    Other fatigue  Continues to have fatigue.  He is interested in testosterone replacement therapy.  His testosterone level was low normal but had decreased since his last reading in 2021.  His brother is on testosterone replacement therapy who has had improvement in his fatigue.  We discussed to repeat testosterone level first.  If low consider referral to urology.  We also discussed that with his recent diagnosis of DAISY, his untreated DAISY may also be contributing to his fatigue.      Past Medical History[1]    Social History[2]    Current Medications and Prescriptions Ordered in Epic[3]    Allergies:  Patient has no known allergies.    Health Maintenance: Completed      Objective:     Vital signs reviewed  Exam:  /70 (BP Location: Left arm, Patient Position: Sitting, BP Cuff Size: Adult)   Pulse 96   Temp 37.1 °C (98.7 °F) (Temporal)   Ht 1.88 m (6' 2\")   Wt 116 kg (255 lb)   SpO2 98%   BMI 32.74 kg/m²  Body mass index is 32.74 kg/m².    Gen: Alert and oriented, No apparent distress.  Neck: Neck is supple without lymphadenopathy.  Lungs: Normal effort, CTA bilaterally, no wheezes, rhonchi, or rales  CV: Regular rate and rhythm. No murmurs, rubs, or gallops.    Assessment & Plan:     55 y.o. male with the following -     1. Primary hypertension (Primary)  Chronic stable " problem.  Continue amlodipine 5 mg daily and losartan 100 mg daily.  Continue home BP monitoring.    2. DAISY (obstructive sleep apnea)  Acute uncomplicated problem.  New problem to examiner.  Recent sleep study positive.  Continue follow-up with sleep medicine.  He asked for a copy of his home sleep study results, copy provided to patient.    3. Wheezing  Chronic stable problem.  He would like a refill on his albuterol inhaler today.  - albuterol 108 (90 Base) MCG/ACT Aero Soln inhalation aerosol; Inhale 2 Puffs every four hours as needed for Shortness of Breath.  Dispense: 6.7 Each; Refill: 2    4. Tobacco use  Chronic stable problem.  Continue with smoking cessation.    5. Other fatigue  6. Low libido  Chronic exacerbated problem.  Repeat testosterone level.  He verbalized understanding to complete lab between 8 AM and 10 AM.  - Testosterone, Free & Total, Adult Male (w/SHBG); Future  - FSH; Future  - LUTEINIZING HORMONE SERUM; Future    7. Flexural eczema  Chronic stable problem.  Refill today on his triamcinolone 0.1% cream.  - triamcinolone acetonide (KENALOG) 0.1 % Cream; APPLY TO HANDS AND ARMS TWICE A DAY AS DIRECTED  Dispense: 80 g; Refill: 1    8. Vitamin D deficiency  Chronic exacerbated problem.  Recommend he complete 12-week course of ergocalciferol 50,000 units weekly and repeat labs after.  Lab orders printed today for patient.    Return in about 6 months (around 1/23/2026) for Hypertension.    Please note that this dictation was created using voice recognition software. I have made every reasonable attempt to correct obvious errors, but I expect that there are errors of grammar and possibly content that I did not discover before finalizing the note.             [1]   Past Medical History:  Diagnosis Date    Hepatitis C     Hypertension     Positive colorectal cancer screening using Cologuard test 09/21/2022    GI referral placed 9/21/2022>> colonoscopy 12/1/2022 with 10-year recall      Tobacco use  05/10/2013   [2]   Social History  Tobacco Use    Smoking status: Former     Current packs/day: 1.00     Average packs/day: 1 pack/day for 30.0 years (30.0 ttl pk-yrs)     Types: Cigarettes    Smokeless tobacco: Never    Tobacco comments:     1 pack every 2 days, quit cigarettes 1 month ago (5/2025)   Vaping Use    Vaping status: Every Day    Substances: Nicotine, Flavoring, 25% or .25 % pt wasnt sure   Substance Use Topics    Alcohol use: No     Comment: sober x 27 years    Drug use: No     Comment: sober x 27 years   [3]   Current Outpatient Medications Ordered in Epic   Medication Sig Dispense Refill    triamcinolone acetonide (KENALOG) 0.1 % Cream APPLY TO HANDS AND ARMS TWICE A DAY AS DIRECTED 80 g 1    albuterol 108 (90 Base) MCG/ACT Aero Soln inhalation aerosol Inhale 2 Puffs every four hours as needed for Shortness of Breath. 6.7 Each 2    ergocalciferol (DRISDOL) 73956 UNIT capsule Take 1 Capsule by mouth every 7 days. 12 Capsule 0    hydrOXYzine HCl (ATARAX) 25 MG Tab Take 1 Tablet by mouth 3 times a day as needed for Anxiety. 30 Tablet 2    amLODIPine (NORVASC) 5 MG Tab Take 1 Tablet by mouth every day. 100 Tablet 3    losartan (COZAAR) 100 MG Tab TAKE 1 TABLET BY MOUTH EVERY DAY 90 Tablet 1    multivitamin Tab Take 1 Tablet by mouth every day. (Patient not taking: Reported on 7/23/2025)       No current [facility]-ordered facility-administered medications on file.

## 2025-07-23 NOTE — ASSESSMENT & PLAN NOTE
Continues to use triamcinolone as needed for his eczema flares.  He needs a medication refill today.

## 2025-07-26 ENCOUNTER — HOSPITAL ENCOUNTER (OUTPATIENT)
Dept: LAB | Facility: MEDICAL CENTER | Age: 55
End: 2025-07-26
Attending: NURSE PRACTITIONER
Payer: COMMERCIAL

## 2025-07-26 DIAGNOSIS — R68.82 LOW LIBIDO: ICD-10-CM

## 2025-07-26 DIAGNOSIS — R53.83 OTHER FATIGUE: ICD-10-CM

## 2025-07-26 LAB
FSH SERPL-ACNC: 4.7 MIU/ML (ref 1.5–12.4)
LH SERPL-ACNC: 4 IU/L (ref 1.7–8.6)

## 2025-07-26 PROCEDURE — 83001 ASSAY OF GONADOTROPIN (FSH): CPT

## 2025-07-26 PROCEDURE — 83002 ASSAY OF GONADOTROPIN (LH): CPT

## 2025-07-26 PROCEDURE — 84402 ASSAY OF FREE TESTOSTERONE: CPT

## 2025-07-26 PROCEDURE — 36415 COLL VENOUS BLD VENIPUNCTURE: CPT

## 2025-07-26 PROCEDURE — 84270 ASSAY OF SEX HORMONE GLOBUL: CPT

## 2025-07-26 PROCEDURE — 84403 ASSAY OF TOTAL TESTOSTERONE: CPT

## 2025-07-29 LAB
SHBG SERPL-SCNC: 42 NMOL/L (ref 19–76)
TESTOST FREE MFR SERPL: 1.6 % (ref 1.6–2.9)
TESTOST FREE SERPL-MCNC: 73 PG/ML (ref 47–244)
TESTOST SERPL-MCNC: 448 NG/DL (ref 300–890)

## 2025-07-30 ENCOUNTER — APPOINTMENT (OUTPATIENT)
Dept: SLEEP MEDICINE | Facility: MEDICAL CENTER | Age: 55
End: 2025-07-30
Payer: COMMERCIAL

## 2025-08-05 ENCOUNTER — OFFICE VISIT (OUTPATIENT)
Dept: SLEEP MEDICINE | Facility: MEDICAL CENTER | Age: 55
End: 2025-08-05
Attending: NURSE PRACTITIONER
Payer: COMMERCIAL

## 2025-08-05 VITALS
OXYGEN SATURATION: 97 % | RESPIRATION RATE: 16 BRPM | DIASTOLIC BLOOD PRESSURE: 90 MMHG | SYSTOLIC BLOOD PRESSURE: 128 MMHG | HEIGHT: 74 IN | BODY MASS INDEX: 32.47 KG/M2 | HEART RATE: 95 BPM | WEIGHT: 253 LBS

## 2025-08-05 DIAGNOSIS — G47.33 OSA (OBSTRUCTIVE SLEEP APNEA): Primary | ICD-10-CM

## 2025-08-05 DIAGNOSIS — G47.34 NOCTURNAL HYPOXIA: ICD-10-CM

## 2025-08-05 PROCEDURE — 99204 OFFICE O/P NEW MOD 45 MIN: CPT

## 2025-08-05 PROCEDURE — 3080F DIAST BP >= 90 MM HG: CPT

## 2025-08-05 PROCEDURE — 3074F SYST BP LT 130 MM HG: CPT

## 2025-08-05 PROCEDURE — 99213 OFFICE O/P EST LOW 20 MIN: CPT

## 2025-08-05 ASSESSMENT — FIBROSIS 4 INDEX: FIB4 SCORE: 1.31
